# Patient Record
Sex: FEMALE | Race: WHITE | Employment: UNEMPLOYED
[De-identification: names, ages, dates, MRNs, and addresses within clinical notes are randomized per-mention and may not be internally consistent; named-entity substitution may affect disease eponyms.]

---

## 2017-01-17 RX ORDER — SUMATRIPTAN 100 MG/1
TABLET, FILM COATED ORAL
Qty: 9 TABLET | Refills: 1 | Status: SHIPPED | OUTPATIENT
Start: 2017-01-17 | End: 2017-05-30 | Stop reason: ALTCHOICE

## 2017-01-23 RX ORDER — TOPIRAMATE 50 MG/1
TABLET, FILM COATED ORAL
Qty: 75 TABLET | Refills: 0 | OUTPATIENT
Start: 2017-01-23 | End: 2017-02-13 | Stop reason: SDUPTHER

## 2017-01-26 ENCOUNTER — TELEPHONE (OUTPATIENT)
Dept: NEUROLOGY | Facility: CLINIC | Age: 57
End: 2017-01-26

## 2017-01-26 ENCOUNTER — PROCEDURE VISIT (OUTPATIENT)
Dept: NEUROLOGY | Facility: CLINIC | Age: 57
End: 2017-01-26

## 2017-01-26 DIAGNOSIS — G43.711 CHRONIC MIGRAINE WITHOUT AURA, WITH INTRACTABLE MIGRAINE, SO STATED, WITH STATUS MIGRAINOSUS: Primary | ICD-10-CM

## 2017-01-26 PROCEDURE — 64615 CHEMODENERV MUSC MIGRAINE: CPT | Performed by: PSYCHIATRY & NEUROLOGY

## 2017-02-14 RX ORDER — TOPIRAMATE 50 MG/1
TABLET, FILM COATED ORAL
Qty: 75 TABLET | Refills: 0 | Status: SHIPPED | OUTPATIENT
Start: 2017-02-14 | End: 2017-03-21 | Stop reason: SDUPTHER

## 2017-02-16 ENCOUNTER — HOSPITAL ENCOUNTER (EMERGENCY)
Age: 57
Discharge: HOME | End: 2017-02-16
Attending: EMERGENCY MEDICINE
Payer: MEDICARE

## 2017-02-16 ENCOUNTER — APPOINTMENT (OUTPATIENT)
Dept: GENERAL RADIOLOGY | Age: 57
End: 2017-02-16
Payer: MEDICARE

## 2017-02-16 VITALS
DIASTOLIC BLOOD PRESSURE: 52 MMHG | WEIGHT: 189 LBS | BODY MASS INDEX: 32.27 KG/M2 | HEIGHT: 64 IN | RESPIRATION RATE: 15 BRPM | OXYGEN SATURATION: 97 % | TEMPERATURE: 98.1 F | HEART RATE: 77 BPM | SYSTOLIC BLOOD PRESSURE: 98 MMHG

## 2017-02-16 DIAGNOSIS — R10.13 ABDOMINAL PAIN, EPIGASTRIC: ICD-10-CM

## 2017-02-16 DIAGNOSIS — R07.89 CHEST WALL PAIN: Primary | ICD-10-CM

## 2017-02-16 LAB
ABSOLUTE EOS #: 0.2 K/UL (ref 0–0.4)
ABSOLUTE LYMPH #: 4.1 K/UL (ref 1–4.8)
ABSOLUTE MONO #: 0.8 K/UL (ref 0.1–1.2)
ANION GAP SERPL CALCULATED.3IONS-SCNC: 16 MMOL/L (ref 9–17)
BASOPHILS # BLD: 2 % (ref 0–2)
BASOPHILS ABSOLUTE: 0.2 K/UL (ref 0–0.2)
BUN BLDV-MCNC: 11 MG/DL (ref 6–20)
BUN/CREAT BLD: NORMAL (ref 9–20)
CALCIUM SERPL-MCNC: 9.4 MG/DL (ref 8.6–10.4)
CHLORIDE BLD-SCNC: 101 MMOL/L (ref 98–107)
CO2: 22 MMOL/L (ref 20–31)
CREAT SERPL-MCNC: 0.66 MG/DL (ref 0.5–0.9)
DIFFERENTIAL TYPE: ABNORMAL
EOSINOPHILS RELATIVE PERCENT: 2 % (ref 1–4)
GFR AFRICAN AMERICAN: >60 ML/MIN
GFR NON-AFRICAN AMERICAN: >60 ML/MIN
GFR SERPL CREATININE-BSD FRML MDRD: NORMAL ML/MIN/{1.73_M2}
GFR SERPL CREATININE-BSD FRML MDRD: NORMAL ML/MIN/{1.73_M2}
GLUCOSE BLD-MCNC: 93 MG/DL (ref 70–99)
HCT VFR BLD CALC: 49 % (ref 36–46)
HEMOGLOBIN: 16 G/DL (ref 12–16)
LYMPHOCYTES # BLD: 43 % (ref 24–44)
MCH RBC QN AUTO: 27.8 PG (ref 26–34)
MCHC RBC AUTO-ENTMCNC: 32.7 G/DL (ref 31–37)
MCV RBC AUTO: 84.8 FL (ref 80–100)
MONOCYTES # BLD: 8 % (ref 2–11)
MYOGLOBIN: <21 NG/ML (ref 25–58)
PDW BLD-RTO: 14.9 % (ref 12.5–15.4)
PLATELET # BLD: 252 K/UL (ref 140–450)
PLATELET ESTIMATE: ABNORMAL
PMV BLD AUTO: 9.2 FL (ref 6–12)
POTASSIUM SERPL-SCNC: 3.9 MMOL/L (ref 3.7–5.3)
RBC # BLD: 5.78 M/UL (ref 4–5.2)
RBC # BLD: ABNORMAL 10*6/UL
SEG NEUTROPHILS: 45 % (ref 36–66)
SEGMENTED NEUTROPHILS ABSOLUTE COUNT: 4.5 K/UL (ref 1.8–7.7)
SODIUM BLD-SCNC: 139 MMOL/L (ref 135–144)
TROPONIN INTERP: ABNORMAL
TROPONIN T: <0.03 NG/ML
WBC # BLD: 9.7 K/UL (ref 3.5–11)
WBC # BLD: ABNORMAL 10*3/UL

## 2017-02-16 PROCEDURE — 80048 BASIC METABOLIC PNL TOTAL CA: CPT

## 2017-02-16 PROCEDURE — 84484 ASSAY OF TROPONIN QUANT: CPT

## 2017-02-16 PROCEDURE — 71020 XR CHEST STANDARD TWO VW: CPT

## 2017-02-16 PROCEDURE — 96372 THER/PROPH/DIAG INJ SC/IM: CPT

## 2017-02-16 PROCEDURE — 36415 COLL VENOUS BLD VENIPUNCTURE: CPT

## 2017-02-16 PROCEDURE — 6370000000 HC RX 637 (ALT 250 FOR IP): Performed by: EMERGENCY MEDICINE

## 2017-02-16 PROCEDURE — 71020 XR CHEST STANDARD TWO VW: CPT | Performed by: RADIOLOGY

## 2017-02-16 PROCEDURE — 83874 ASSAY OF MYOGLOBIN: CPT

## 2017-02-16 PROCEDURE — 6360000002 HC RX W HCPCS: Performed by: EMERGENCY MEDICINE

## 2017-02-16 PROCEDURE — 96374 THER/PROPH/DIAG INJ IV PUSH: CPT

## 2017-02-16 PROCEDURE — 93005 ELECTROCARDIOGRAM TRACING: CPT

## 2017-02-16 PROCEDURE — 85025 COMPLETE CBC W/AUTO DIFF WBC: CPT

## 2017-02-16 PROCEDURE — 99285 EMERGENCY DEPT VISIT HI MDM: CPT

## 2017-02-16 PROCEDURE — 96375 TX/PRO/DX INJ NEW DRUG ADDON: CPT

## 2017-02-16 PROCEDURE — 96376 TX/PRO/DX INJ SAME DRUG ADON: CPT

## 2017-02-16 RX ORDER — METOCLOPRAMIDE HYDROCHLORIDE 5 MG/ML
10 INJECTION INTRAMUSCULAR; INTRAVENOUS ONCE
Status: COMPLETED | OUTPATIENT
Start: 2017-02-16 | End: 2017-02-16

## 2017-02-16 RX ORDER — ONDANSETRON 2 MG/ML
4 INJECTION INTRAMUSCULAR; INTRAVENOUS ONCE
Status: COMPLETED | OUTPATIENT
Start: 2017-02-16 | End: 2017-02-16

## 2017-02-16 RX ORDER — METOCLOPRAMIDE HYDROCHLORIDE 5 MG/ML
10 INJECTION INTRAMUSCULAR; INTRAVENOUS ONCE
Status: DISCONTINUED | OUTPATIENT
Start: 2017-02-16 | End: 2017-02-16

## 2017-02-16 RX ORDER — MORPHINE SULFATE 4 MG/ML
4 INJECTION, SOLUTION INTRAMUSCULAR; INTRAVENOUS ONCE
Status: COMPLETED | OUTPATIENT
Start: 2017-02-16 | End: 2017-02-16

## 2017-02-16 RX ORDER — ASPIRIN 81 MG/1
324 TABLET, CHEWABLE ORAL ONCE
Status: COMPLETED | OUTPATIENT
Start: 2017-02-16 | End: 2017-02-16

## 2017-02-16 RX ADMIN — HYDROMORPHONE HYDROCHLORIDE 1 MG: 1 INJECTION, SOLUTION INTRAMUSCULAR; INTRAVENOUS; SUBCUTANEOUS at 20:26

## 2017-02-16 RX ADMIN — MORPHINE SULFATE 4 MG: 4 INJECTION, SOLUTION INTRAMUSCULAR; INTRAVENOUS at 19:27

## 2017-02-16 RX ADMIN — ONDANSETRON 4 MG: 2 INJECTION, SOLUTION INTRAMUSCULAR; INTRAVENOUS at 19:27

## 2017-02-16 RX ADMIN — METOCLOPRAMIDE 10 MG: 5 INJECTION, SOLUTION INTRAMUSCULAR; INTRAVENOUS at 20:26

## 2017-02-16 RX ADMIN — ASPIRIN 81 MG 324 MG: 81 TABLET ORAL at 19:27

## 2017-02-16 RX ADMIN — ONDANSETRON 4 MG: 2 INJECTION, SOLUTION INTRAMUSCULAR; INTRAVENOUS at 20:26

## 2017-02-16 ASSESSMENT — PAIN DESCRIPTION - ORIENTATION: ORIENTATION: RIGHT

## 2017-02-16 ASSESSMENT — ENCOUNTER SYMPTOMS
BACK PAIN: 0
WHEEZING: 0
COLOR CHANGE: 0
SHORTNESS OF BREATH: 0
EYE PAIN: 0
CHEST TIGHTNESS: 0
NAUSEA: 1
EYE REDNESS: 0
CONSTIPATION: 0
ABDOMINAL PAIN: 0
DIARRHEA: 0
RHINORRHEA: 0
EYE DISCHARGE: 0
SORE THROAT: 0
COUGH: 0

## 2017-02-16 ASSESSMENT — PAIN DESCRIPTION - DESCRIPTORS: DESCRIPTORS: HEADACHE

## 2017-02-16 ASSESSMENT — PAIN SCALES - GENERAL
PAINLEVEL_OUTOF10: 7
PAINLEVEL_OUTOF10: 7

## 2017-02-16 ASSESSMENT — PAIN DESCRIPTION - PAIN TYPE
TYPE: ACUTE PAIN
TYPE: ACUTE PAIN

## 2017-02-16 ASSESSMENT — PAIN DESCRIPTION - LOCATION
LOCATION: ABDOMEN;HEAD
LOCATION: ABDOMEN;HEAD

## 2017-02-16 ASSESSMENT — PAIN DESCRIPTION - PROGRESSION: CLINICAL_PROGRESSION: NOT CHANGED

## 2017-02-17 LAB
EKG ATRIAL RATE: 90 BPM
EKG P AXIS: 64 DEGREES
EKG P-R INTERVAL: 152 MS
EKG Q-T INTERVAL: 376 MS
EKG QRS DURATION: 84 MS
EKG QTC CALCULATION (BAZETT): 459 MS
EKG R AXIS: 52 DEGREES
EKG T AXIS: 30 DEGREES
EKG VENTRICULAR RATE: 90 BPM

## 2017-03-21 ENCOUNTER — HOSPITAL ENCOUNTER (EMERGENCY)
Age: 57
Discharge: HOME | End: 2017-03-22
Attending: EMERGENCY MEDICINE
Payer: MEDICARE

## 2017-03-21 DIAGNOSIS — G43.D0 ABDOMINAL MIGRAINE, NOT INTRACTABLE: Primary | ICD-10-CM

## 2017-03-21 PROCEDURE — 99283 EMERGENCY DEPT VISIT LOW MDM: CPT

## 2017-03-21 RX ORDER — 0.9 % SODIUM CHLORIDE 0.9 %
1000 INTRAVENOUS SOLUTION INTRAVENOUS ONCE
Status: COMPLETED | OUTPATIENT
Start: 2017-03-21 | End: 2017-03-22

## 2017-03-21 RX ORDER — PROMETHAZINE HYDROCHLORIDE 25 MG/ML
25 INJECTION, SOLUTION INTRAMUSCULAR; INTRAVENOUS ONCE
Status: COMPLETED | OUTPATIENT
Start: 2017-03-21 | End: 2017-03-22

## 2017-03-21 RX ORDER — TOPIRAMATE 50 MG/1
TABLET, FILM COATED ORAL
Qty: 75 TABLET | Refills: 0 | Status: SHIPPED | OUTPATIENT
Start: 2017-03-21 | End: 2017-04-18 | Stop reason: SDUPTHER

## 2017-03-21 ASSESSMENT — PAIN DESCRIPTION - PAIN TYPE: TYPE: ACUTE PAIN

## 2017-03-21 ASSESSMENT — PAIN DESCRIPTION - LOCATION: LOCATION: ABDOMEN

## 2017-03-21 ASSESSMENT — PAIN SCALES - GENERAL: PAINLEVEL_OUTOF10: 8

## 2017-03-22 VITALS
SYSTOLIC BLOOD PRESSURE: 127 MMHG | BODY MASS INDEX: 32.27 KG/M2 | WEIGHT: 189 LBS | HEIGHT: 64 IN | HEART RATE: 81 BPM | OXYGEN SATURATION: 97 % | RESPIRATION RATE: 16 BRPM | DIASTOLIC BLOOD PRESSURE: 79 MMHG | TEMPERATURE: 98.4 F

## 2017-03-22 PROCEDURE — 96375 TX/PRO/DX INJ NEW DRUG ADDON: CPT

## 2017-03-22 PROCEDURE — 6360000002 HC RX W HCPCS: Performed by: EMERGENCY MEDICINE

## 2017-03-22 PROCEDURE — 96374 THER/PROPH/DIAG INJ IV PUSH: CPT

## 2017-03-22 PROCEDURE — 2580000003 HC RX 258: Performed by: EMERGENCY MEDICINE

## 2017-03-22 PROCEDURE — 96376 TX/PRO/DX INJ SAME DRUG ADON: CPT

## 2017-03-22 RX ORDER — ONDANSETRON 2 MG/ML
4 INJECTION INTRAMUSCULAR; INTRAVENOUS ONCE
Status: COMPLETED | OUTPATIENT
Start: 2017-03-22 | End: 2017-03-22

## 2017-03-22 RX ORDER — KETOROLAC TROMETHAMINE 30 MG/ML
30 INJECTION, SOLUTION INTRAMUSCULAR; INTRAVENOUS ONCE
Status: COMPLETED | OUTPATIENT
Start: 2017-03-22 | End: 2017-03-22

## 2017-03-22 RX ADMIN — HYDROMORPHONE HYDROCHLORIDE 1 MG: 1 INJECTION, SOLUTION INTRAMUSCULAR; INTRAVENOUS; SUBCUTANEOUS at 01:14

## 2017-03-22 RX ADMIN — ONDANSETRON 4 MG: 2 INJECTION, SOLUTION INTRAMUSCULAR; INTRAVENOUS at 01:13

## 2017-03-22 RX ADMIN — HYDROMORPHONE HYDROCHLORIDE 1 MG: 1 INJECTION, SOLUTION INTRAMUSCULAR; INTRAVENOUS; SUBCUTANEOUS at 00:06

## 2017-03-22 RX ADMIN — SODIUM CHLORIDE 1000 ML: 9 INJECTION, SOLUTION INTRAVENOUS at 00:06

## 2017-03-22 RX ADMIN — PROMETHAZINE HYDROCHLORIDE 25 MG: 25 INJECTION, SOLUTION INTRAMUSCULAR; INTRAVENOUS at 00:06

## 2017-03-22 RX ADMIN — KETOROLAC TROMETHAMINE 30 MG: 30 INJECTION, SOLUTION INTRAMUSCULAR at 01:13

## 2017-03-22 ASSESSMENT — PAIN SCALES - GENERAL
PAINLEVEL_OUTOF10: 8
PAINLEVEL_OUTOF10: 6
PAINLEVEL_OUTOF10: 6
PAINLEVEL_OUTOF10: 0

## 2017-03-22 ASSESSMENT — ENCOUNTER SYMPTOMS
COLOR CHANGE: 0
SORE THROAT: 0
RHINORRHEA: 0
COUGH: 0
SHORTNESS OF BREATH: 0
EYE PAIN: 0
DIARRHEA: 0
BACK PAIN: 0
WHEEZING: 0
NAUSEA: 1
ABDOMINAL PAIN: 1
EYE DISCHARGE: 0
CHEST TIGHTNESS: 0
EYE REDNESS: 0
CONSTIPATION: 0

## 2017-03-23 RX ORDER — RIZATRIPTAN BENZOATE 5 MG/1
TABLET, ORALLY DISINTEGRATING ORAL
Qty: 10 TABLET | Refills: 1 | Status: SHIPPED | OUTPATIENT
Start: 2017-03-23 | End: 2017-04-26 | Stop reason: SDUPTHER

## 2017-03-31 ENCOUNTER — APPOINTMENT (OUTPATIENT)
Dept: GENERAL RADIOLOGY | Age: 57
End: 2017-03-31
Payer: MEDICARE

## 2017-03-31 ENCOUNTER — HOSPITAL ENCOUNTER (EMERGENCY)
Age: 57
Discharge: HOME | End: 2017-03-31
Attending: EMERGENCY MEDICINE
Payer: MEDICARE

## 2017-03-31 VITALS
BODY MASS INDEX: 31.92 KG/M2 | RESPIRATION RATE: 21 BRPM | HEART RATE: 86 BPM | OXYGEN SATURATION: 94 % | WEIGHT: 187 LBS | HEIGHT: 64 IN | DIASTOLIC BLOOD PRESSURE: 83 MMHG | TEMPERATURE: 98.2 F | SYSTOLIC BLOOD PRESSURE: 166 MMHG

## 2017-03-31 DIAGNOSIS — G43.D0: Primary | ICD-10-CM

## 2017-03-31 LAB
ABSOLUTE EOS #: 0.2 K/UL (ref 0–0.4)
ABSOLUTE LYMPH #: 3.9 K/UL (ref 1–4.8)
ABSOLUTE MONO #: 0.6 K/UL (ref 0.1–1.2)
ALBUMIN SERPL-MCNC: 4.1 G/DL (ref 3.5–5.2)
ALBUMIN/GLOBULIN RATIO: 1.5 (ref 1–2.5)
ALP BLD-CCNC: 80 U/L (ref 35–104)
ALT SERPL-CCNC: 14 U/L (ref 5–33)
AMYLASE: 31 U/L (ref 28–100)
ANION GAP SERPL CALCULATED.3IONS-SCNC: 15 MMOL/L (ref 9–17)
AST SERPL-CCNC: 19 U/L
BASOPHILS # BLD: 1 % (ref 0–2)
BASOPHILS ABSOLUTE: 0.1 K/UL (ref 0–0.2)
BILIRUB SERPL-MCNC: 0.19 MG/DL (ref 0.3–1.2)
BILIRUBIN DIRECT: <0.08 MG/DL
BILIRUBIN URINE: NEGATIVE
BILIRUBIN, INDIRECT: ABNORMAL MG/DL (ref 0–1)
BUN BLDV-MCNC: 7 MG/DL (ref 6–20)
BUN/CREAT BLD: ABNORMAL (ref 9–20)
CALCIUM SERPL-MCNC: 9 MG/DL (ref 8.6–10.4)
CHLORIDE BLD-SCNC: 107 MMOL/L (ref 98–107)
CO2: 21 MMOL/L (ref 20–31)
COLOR: YELLOW
COMMENT UA: NORMAL
CREAT SERPL-MCNC: 0.56 MG/DL (ref 0.5–0.9)
DIFFERENTIAL TYPE: ABNORMAL
EOSINOPHILS RELATIVE PERCENT: 2 % (ref 1–4)
GFR AFRICAN AMERICAN: >60 ML/MIN
GFR NON-AFRICAN AMERICAN: >60 ML/MIN
GFR SERPL CREATININE-BSD FRML MDRD: ABNORMAL ML/MIN/{1.73_M2}
GFR SERPL CREATININE-BSD FRML MDRD: ABNORMAL ML/MIN/{1.73_M2}
GLOBULIN: ABNORMAL G/DL (ref 1.5–3.8)
GLUCOSE BLD-MCNC: 100 MG/DL (ref 70–99)
GLUCOSE URINE: NEGATIVE
HCT VFR BLD CALC: 44.5 % (ref 36–46)
HEMOGLOBIN: 14.7 G/DL (ref 12–16)
KETONES, URINE: NEGATIVE
LACTIC ACID: 0.6 MMOL/L (ref 0.5–2.2)
LEUKOCYTE ESTERASE, URINE: NEGATIVE
LIPASE: 28 U/L (ref 13–60)
LYMPHOCYTES # BLD: 44 % (ref 24–44)
MCH RBC QN AUTO: 28.1 PG (ref 26–34)
MCHC RBC AUTO-ENTMCNC: 33.1 G/DL (ref 31–37)
MCV RBC AUTO: 84.8 FL (ref 80–100)
MONOCYTES # BLD: 7 % (ref 2–11)
NITRITE, URINE: NEGATIVE
PDW BLD-RTO: 14.2 % (ref 12.5–15.4)
PH UA: 7 (ref 5–8)
PLATELET # BLD: 250 K/UL (ref 140–450)
PLATELET ESTIMATE: ABNORMAL
PMV BLD AUTO: 9.6 FL (ref 6–12)
POTASSIUM SERPL-SCNC: 4 MMOL/L (ref 3.7–5.3)
PROTEIN UA: NEGATIVE
RBC # BLD: 5.25 M/UL (ref 4–5.2)
RBC # BLD: ABNORMAL 10*6/UL
SEG NEUTROPHILS: 46 % (ref 36–66)
SEGMENTED NEUTROPHILS ABSOLUTE COUNT: 4.1 K/UL (ref 1.8–7.7)
SODIUM BLD-SCNC: 143 MMOL/L (ref 135–144)
SPECIFIC GRAVITY UA: 1.01 (ref 1–1.03)
TOTAL PROTEIN: 6.9 G/DL (ref 6.4–8.3)
TROPONIN INTERP: NORMAL
TROPONIN T: <0.03 NG/ML
TURBIDITY: CLEAR
URINE HGB: NEGATIVE
UROBILINOGEN, URINE: NORMAL
WBC # BLD: 8.9 K/UL (ref 3.5–11)
WBC # BLD: ABNORMAL 10*3/UL

## 2017-03-31 PROCEDURE — 80048 BASIC METABOLIC PNL TOTAL CA: CPT

## 2017-03-31 PROCEDURE — 96374 THER/PROPH/DIAG INJ IV PUSH: CPT

## 2017-03-31 PROCEDURE — 93005 ELECTROCARDIOGRAM TRACING: CPT

## 2017-03-31 PROCEDURE — 83690 ASSAY OF LIPASE: CPT

## 2017-03-31 PROCEDURE — 84484 ASSAY OF TROPONIN QUANT: CPT

## 2017-03-31 PROCEDURE — 74022 RADEX COMPL AQT ABD SERIES: CPT

## 2017-03-31 PROCEDURE — 96375 TX/PRO/DX INJ NEW DRUG ADDON: CPT

## 2017-03-31 PROCEDURE — 36415 COLL VENOUS BLD VENIPUNCTURE: CPT

## 2017-03-31 PROCEDURE — 80076 HEPATIC FUNCTION PANEL: CPT

## 2017-03-31 PROCEDURE — 6360000002 HC RX W HCPCS: Performed by: EMERGENCY MEDICINE

## 2017-03-31 PROCEDURE — 96361 HYDRATE IV INFUSION ADD-ON: CPT

## 2017-03-31 PROCEDURE — 83605 ASSAY OF LACTIC ACID: CPT

## 2017-03-31 PROCEDURE — 96376 TX/PRO/DX INJ SAME DRUG ADON: CPT

## 2017-03-31 PROCEDURE — 85025 COMPLETE CBC W/AUTO DIFF WBC: CPT

## 2017-03-31 PROCEDURE — 82150 ASSAY OF AMYLASE: CPT

## 2017-03-31 PROCEDURE — 2580000003 HC RX 258: Performed by: EMERGENCY MEDICINE

## 2017-03-31 PROCEDURE — 99285 EMERGENCY DEPT VISIT HI MDM: CPT

## 2017-03-31 RX ORDER — ONDANSETRON 2 MG/ML
4 INJECTION INTRAMUSCULAR; INTRAVENOUS ONCE
Status: COMPLETED | OUTPATIENT
Start: 2017-03-31 | End: 2017-03-31

## 2017-03-31 RX ORDER — ARIPIPRAZOLE 5 MG/1
15 TABLET ORAL DAILY
COMMUNITY
End: 2021-12-13

## 2017-03-31 RX ORDER — 0.9 % SODIUM CHLORIDE 0.9 %
500 INTRAVENOUS SOLUTION INTRAVENOUS ONCE
Status: COMPLETED | OUTPATIENT
Start: 2017-03-31 | End: 2017-03-31

## 2017-03-31 RX ORDER — KETOROLAC TROMETHAMINE 30 MG/ML
30 INJECTION, SOLUTION INTRAMUSCULAR; INTRAVENOUS ONCE
Status: COMPLETED | OUTPATIENT
Start: 2017-03-31 | End: 2017-03-31

## 2017-03-31 RX ADMIN — KETOROLAC TROMETHAMINE 30 MG: 30 INJECTION, SOLUTION INTRAMUSCULAR at 20:55

## 2017-03-31 RX ADMIN — HYDROMORPHONE HYDROCHLORIDE 2 MG: 1 INJECTION, SOLUTION INTRAMUSCULAR; INTRAVENOUS; SUBCUTANEOUS at 19:56

## 2017-03-31 RX ADMIN — SODIUM CHLORIDE 500 ML: 9 INJECTION, SOLUTION INTRAVENOUS at 19:55

## 2017-03-31 RX ADMIN — ONDANSETRON 4 MG: 2 INJECTION, SOLUTION INTRAMUSCULAR; INTRAVENOUS at 19:56

## 2017-03-31 RX ADMIN — ONDANSETRON 4 MG: 2 INJECTION, SOLUTION INTRAMUSCULAR; INTRAVENOUS at 20:50

## 2017-03-31 ASSESSMENT — PAIN SCALES - GENERAL
PAINLEVEL_OUTOF10: 2
PAINLEVEL_OUTOF10: 8
PAINLEVEL_OUTOF10: 6
PAINLEVEL_OUTOF10: 8
PAINLEVEL_OUTOF10: 6

## 2017-03-31 ASSESSMENT — PAIN DESCRIPTION - LOCATION
LOCATION: ABDOMEN;HEAD
LOCATION: ABDOMEN

## 2017-03-31 ASSESSMENT — PAIN DESCRIPTION - PAIN TYPE: TYPE: ACUTE PAIN

## 2017-03-31 ASSESSMENT — PAIN DESCRIPTION - PROGRESSION
CLINICAL_PROGRESSION: RAPIDLY IMPROVING
CLINICAL_PROGRESSION: GRADUALLY IMPROVING

## 2017-04-01 LAB
EKG ATRIAL RATE: 68 BPM
EKG P AXIS: 55 DEGREES
EKG P-R INTERVAL: 144 MS
EKG Q-T INTERVAL: 398 MS
EKG QRS DURATION: 86 MS
EKG QTC CALCULATION (BAZETT): 423 MS
EKG R AXIS: 51 DEGREES
EKG T AXIS: 40 DEGREES
EKG VENTRICULAR RATE: 68 BPM

## 2017-04-06 ENCOUNTER — HOSPITAL ENCOUNTER (EMERGENCY)
Age: 57
Discharge: HOME | End: 2017-04-06
Attending: EMERGENCY MEDICINE
Payer: MEDICARE

## 2017-04-06 VITALS
RESPIRATION RATE: 18 BRPM | HEART RATE: 79 BPM | SYSTOLIC BLOOD PRESSURE: 151 MMHG | OXYGEN SATURATION: 97 % | HEIGHT: 64 IN | DIASTOLIC BLOOD PRESSURE: 71 MMHG | TEMPERATURE: 98.1 F | BODY MASS INDEX: 30.9 KG/M2 | WEIGHT: 181 LBS

## 2017-04-06 DIAGNOSIS — L02.91 ABSCESS: Primary | ICD-10-CM

## 2017-04-06 PROCEDURE — 2500000003 HC RX 250 WO HCPCS: Performed by: EMERGENCY MEDICINE

## 2017-04-06 PROCEDURE — 10060 I&D ABSCESS SIMPLE/SINGLE: CPT

## 2017-04-06 PROCEDURE — 99282 EMERGENCY DEPT VISIT SF MDM: CPT

## 2017-04-06 RX ORDER — CLONAZEPAM 0.5 MG/1
0.5 TABLET ORAL 3 TIMES DAILY PRN
Status: ON HOLD | COMMUNITY
End: 2018-11-27

## 2017-04-06 RX ORDER — LIDOCAINE HYDROCHLORIDE AND EPINEPHRINE 10; 10 MG/ML; UG/ML
20 INJECTION, SOLUTION INFILTRATION; PERINEURAL ONCE
Status: COMPLETED | OUTPATIENT
Start: 2017-04-06 | End: 2017-04-06

## 2017-04-06 RX ORDER — CEPHALEXIN 500 MG/1
500 CAPSULE ORAL 4 TIMES DAILY
Qty: 40 CAPSULE | Refills: 0 | Status: SHIPPED | OUTPATIENT
Start: 2017-04-06 | End: 2017-04-16

## 2017-04-06 RX ADMIN — LIDOCAINE HYDROCHLORIDE AND EPINEPHRINE 20 ML: 10; 10 INJECTION, SOLUTION INFILTRATION; PERINEURAL at 20:19

## 2017-04-06 ASSESSMENT — PAIN DESCRIPTION - ORIENTATION: ORIENTATION: LEFT

## 2017-04-06 ASSESSMENT — PAIN DESCRIPTION - FREQUENCY: FREQUENCY: CONTINUOUS

## 2017-04-06 ASSESSMENT — PAIN SCALES - GENERAL: PAINLEVEL_OUTOF10: 10

## 2017-04-06 ASSESSMENT — PAIN DESCRIPTION - PAIN TYPE: TYPE: ACUTE PAIN

## 2017-04-18 RX ORDER — TOPIRAMATE 50 MG/1
TABLET, FILM COATED ORAL
Qty: 75 TABLET | Refills: 0 | Status: SHIPPED | OUTPATIENT
Start: 2017-04-18 | End: 2017-05-15 | Stop reason: SDUPTHER

## 2017-04-26 ENCOUNTER — PROCEDURE VISIT (OUTPATIENT)
Dept: NEUROLOGY | Age: 57
End: 2017-04-26
Payer: MEDICARE

## 2017-04-26 DIAGNOSIS — G43.711 CHRONIC MIGRAINE WITHOUT AURA, WITH INTRACTABLE MIGRAINE, SO STATED, WITH STATUS MIGRAINOSUS: Primary | ICD-10-CM

## 2017-04-26 PROCEDURE — 64615 CHEMODENERV MUSC MIGRAINE: CPT | Performed by: PSYCHIATRY & NEUROLOGY

## 2017-04-26 RX ORDER — SUMATRIPTAN 6 MG/.5ML
INJECTION, SOLUTION SUBCUTANEOUS
Qty: 2 VIAL | Refills: 3 | Status: SHIPPED | OUTPATIENT
Start: 2017-04-26 | End: 2017-05-30 | Stop reason: ALTCHOICE

## 2017-04-26 RX ORDER — BUTALBITAL, ACETAMINOPHEN AND CAFFEINE 50; 325; 40 MG/1; MG/1; MG/1
1 TABLET ORAL EVERY 6 HOURS PRN
Qty: 60 TABLET | Refills: 3 | Status: SHIPPED | OUTPATIENT
Start: 2017-04-26 | End: 2019-04-23 | Stop reason: ALTCHOICE

## 2017-04-26 RX ORDER — RIZATRIPTAN BENZOATE 5 MG/1
TABLET, ORALLY DISINTEGRATING ORAL
Qty: 10 TABLET | Refills: 1 | Status: SHIPPED | OUTPATIENT
Start: 2017-04-26 | End: 2018-07-11 | Stop reason: SDUPTHER

## 2017-04-30 ENCOUNTER — HOSPITAL ENCOUNTER (EMERGENCY)
Age: 57
Discharge: HOME | End: 2017-04-30
Attending: EMERGENCY MEDICINE
Payer: MEDICARE

## 2017-04-30 ENCOUNTER — APPOINTMENT (OUTPATIENT)
Dept: GENERAL RADIOLOGY | Age: 57
End: 2017-04-30
Payer: MEDICARE

## 2017-04-30 VITALS
DIASTOLIC BLOOD PRESSURE: 80 MMHG | WEIGHT: 189 LBS | HEIGHT: 64 IN | SYSTOLIC BLOOD PRESSURE: 131 MMHG | TEMPERATURE: 98.2 F | BODY MASS INDEX: 32.27 KG/M2 | HEART RATE: 98 BPM | OXYGEN SATURATION: 98 % | RESPIRATION RATE: 18 BRPM

## 2017-04-30 DIAGNOSIS — M25.561 ACUTE PAIN OF RIGHT KNEE: Primary | ICD-10-CM

## 2017-04-30 PROCEDURE — 99283 EMERGENCY DEPT VISIT LOW MDM: CPT

## 2017-04-30 PROCEDURE — 73562 X-RAY EXAM OF KNEE 3: CPT

## 2017-04-30 PROCEDURE — 6360000002 HC RX W HCPCS: Performed by: EMERGENCY MEDICINE

## 2017-04-30 PROCEDURE — 96372 THER/PROPH/DIAG INJ SC/IM: CPT

## 2017-04-30 RX ORDER — KETOROLAC TROMETHAMINE 30 MG/ML
30 INJECTION, SOLUTION INTRAMUSCULAR; INTRAVENOUS ONCE
Status: COMPLETED | OUTPATIENT
Start: 2017-04-30 | End: 2017-04-30

## 2017-04-30 RX ORDER — AMOXICILLIN 500 MG/1
500 CAPSULE ORAL 3 TIMES DAILY
COMMUNITY
End: 2017-05-30 | Stop reason: ALTCHOICE

## 2017-04-30 RX ORDER — CIPROFLOXACIN 500 MG/1
500 TABLET, FILM COATED ORAL 2 TIMES DAILY
COMMUNITY
End: 2017-05-30 | Stop reason: ALTCHOICE

## 2017-04-30 RX ADMIN — KETOROLAC TROMETHAMINE 30 MG: 30 INJECTION, SOLUTION INTRAMUSCULAR at 12:14

## 2017-04-30 ASSESSMENT — PAIN DESCRIPTION - LOCATION: LOCATION: KNEE

## 2017-04-30 ASSESSMENT — PAIN DESCRIPTION - ORIENTATION: ORIENTATION: RIGHT

## 2017-04-30 ASSESSMENT — PAIN SCALES - GENERAL
PAINLEVEL_OUTOF10: 10
PAINLEVEL_OUTOF10: 10
PAINLEVEL_OUTOF10: 8

## 2017-04-30 ASSESSMENT — PAIN DESCRIPTION - DESCRIPTORS: DESCRIPTORS: SHARP;THROBBING

## 2017-05-16 RX ORDER — TOPIRAMATE 50 MG/1
TABLET, FILM COATED ORAL
Qty: 75 TABLET | Refills: 3 | Status: SHIPPED | OUTPATIENT
Start: 2017-05-16 | End: 2017-10-06 | Stop reason: SDUPTHER

## 2017-05-24 ENCOUNTER — HOSPITAL ENCOUNTER (EMERGENCY)
Age: 57
Discharge: HOME | End: 2017-05-24
Attending: EMERGENCY MEDICINE
Payer: MEDICARE

## 2017-05-24 VITALS
DIASTOLIC BLOOD PRESSURE: 64 MMHG | HEART RATE: 68 BPM | HEIGHT: 64 IN | OXYGEN SATURATION: 100 % | WEIGHT: 189 LBS | TEMPERATURE: 98.2 F | SYSTOLIC BLOOD PRESSURE: 120 MMHG | RESPIRATION RATE: 16 BRPM | BODY MASS INDEX: 32.27 KG/M2

## 2017-05-24 DIAGNOSIS — M25.561 RIGHT MEDIAL KNEE PAIN: Primary | ICD-10-CM

## 2017-05-24 PROCEDURE — 96372 THER/PROPH/DIAG INJ SC/IM: CPT

## 2017-05-24 PROCEDURE — 90715 TDAP VACCINE 7 YRS/> IM: CPT | Performed by: PHYSICIAN ASSISTANT

## 2017-05-24 PROCEDURE — 90471 IMMUNIZATION ADMIN: CPT | Performed by: PHYSICIAN ASSISTANT

## 2017-05-24 PROCEDURE — 99283 EMERGENCY DEPT VISIT LOW MDM: CPT

## 2017-05-24 PROCEDURE — 6360000002 HC RX W HCPCS: Performed by: PHYSICIAN ASSISTANT

## 2017-05-24 RX ORDER — KETOROLAC TROMETHAMINE 30 MG/ML
60 INJECTION, SOLUTION INTRAMUSCULAR; INTRAVENOUS ONCE
Status: COMPLETED | OUTPATIENT
Start: 2017-05-24 | End: 2017-05-24

## 2017-05-24 RX ADMIN — TETANUS TOXOID, REDUCED DIPHTHERIA TOXOID AND ACELLULAR PERTUSSIS VACCINE, ADSORBED 0.5 ML: 5; 2.5; 8; 8; 2.5 SUSPENSION INTRAMUSCULAR at 12:44

## 2017-05-24 RX ADMIN — KETOROLAC TROMETHAMINE 60 MG: 30 INJECTION, SOLUTION INTRAMUSCULAR at 12:45

## 2017-05-24 ASSESSMENT — PAIN DESCRIPTION - PAIN TYPE: TYPE: ACUTE PAIN

## 2017-05-24 ASSESSMENT — PAIN SCALES - GENERAL: PAINLEVEL_OUTOF10: 10

## 2017-05-30 ENCOUNTER — OFFICE VISIT (OUTPATIENT)
Dept: NEUROLOGY | Age: 57
End: 2017-05-30
Payer: MEDICARE

## 2017-05-30 ENCOUNTER — TELEPHONE (OUTPATIENT)
Dept: NEUROLOGY | Age: 57
End: 2017-05-30

## 2017-05-30 VITALS
SYSTOLIC BLOOD PRESSURE: 145 MMHG | BODY MASS INDEX: 32.33 KG/M2 | DIASTOLIC BLOOD PRESSURE: 82 MMHG | HEART RATE: 90 BPM | HEIGHT: 64 IN | WEIGHT: 189.4 LBS

## 2017-05-30 DIAGNOSIS — G43.711 CHRONIC MIGRAINE WITHOUT AURA, WITH INTRACTABLE MIGRAINE, SO STATED, WITH STATUS MIGRAINOSUS: Primary | ICD-10-CM

## 2017-05-30 PROCEDURE — 99214 OFFICE O/P EST MOD 30 MIN: CPT | Performed by: PSYCHIATRY & NEUROLOGY

## 2017-05-30 RX ORDER — CEFUROXIME AXETIL 250 MG/1
TABLET ORAL
COMMUNITY
Start: 2017-04-27 | End: 2018-10-03 | Stop reason: SDUPTHER

## 2017-07-30 ENCOUNTER — PROCEDURE VISIT (OUTPATIENT)
Dept: NEUROLOGY | Age: 57
End: 2017-07-30
Payer: MEDICARE

## 2017-07-30 DIAGNOSIS — G43.711 CHRONIC MIGRAINE WITHOUT AURA, WITH INTRACTABLE MIGRAINE, SO STATED, WITH STATUS MIGRAINOSUS: Primary | ICD-10-CM

## 2017-07-30 PROCEDURE — 64615 CHEMODENERV MUSC MIGRAINE: CPT | Performed by: PSYCHIATRY & NEUROLOGY

## 2017-08-29 ENCOUNTER — TELEPHONE (OUTPATIENT)
Dept: NEUROLOGY | Age: 57
End: 2017-08-29

## 2017-09-09 ENCOUNTER — HOSPITAL ENCOUNTER (EMERGENCY)
Age: 57
Discharge: HOME | End: 2017-09-09
Attending: EMERGENCY MEDICINE
Payer: MEDICARE

## 2017-09-09 VITALS
DIASTOLIC BLOOD PRESSURE: 48 MMHG | HEART RATE: 72 BPM | RESPIRATION RATE: 22 BRPM | TEMPERATURE: 98.2 F | SYSTOLIC BLOOD PRESSURE: 92 MMHG | OXYGEN SATURATION: 95 % | HEIGHT: 63 IN | BODY MASS INDEX: 33.66 KG/M2 | WEIGHT: 190 LBS

## 2017-09-09 DIAGNOSIS — J44.1 CHRONIC OBSTRUCTIVE PULMONARY DISEASE WITH ACUTE EXACERBATION (HCC): ICD-10-CM

## 2017-09-09 DIAGNOSIS — J40 BRONCHITIS: Primary | ICD-10-CM

## 2017-09-09 PROCEDURE — 99283 EMERGENCY DEPT VISIT LOW MDM: CPT

## 2017-09-09 PROCEDURE — 6360000002 HC RX W HCPCS

## 2017-09-09 PROCEDURE — 6370000000 HC RX 637 (ALT 250 FOR IP): Performed by: EMERGENCY MEDICINE

## 2017-09-09 RX ORDER — ALBUTEROL SULFATE 2.5 MG/3ML
2.5 SOLUTION RESPIRATORY (INHALATION) EVERY 6 HOURS PRN
Qty: 50 EACH | Refills: 0 | Status: SHIPPED | OUTPATIENT
Start: 2017-09-09 | End: 2021-06-08

## 2017-09-09 RX ORDER — ALBUTEROL SULFATE 90 UG/1
2 AEROSOL, METERED RESPIRATORY (INHALATION) ONCE
Status: COMPLETED | OUTPATIENT
Start: 2017-09-09 | End: 2017-09-09

## 2017-09-09 RX ORDER — DOXYCYCLINE 100 MG/1
100 TABLET ORAL 2 TIMES DAILY
Qty: 14 TABLET | Refills: 0 | Status: SHIPPED | OUTPATIENT
Start: 2017-09-09 | End: 2017-09-16

## 2017-09-09 RX ORDER — IPRATROPIUM BROMIDE AND ALBUTEROL SULFATE 2.5; .5 MG/3ML; MG/3ML
1 SOLUTION RESPIRATORY (INHALATION) ONCE
Status: COMPLETED | OUTPATIENT
Start: 2017-09-09 | End: 2017-09-09

## 2017-09-09 RX ORDER — ALBUTEROL SULFATE 2.5 MG/3ML
SOLUTION RESPIRATORY (INHALATION)
Status: COMPLETED
Start: 2017-09-09 | End: 2017-09-09

## 2017-09-09 RX ORDER — PREDNISONE 50 MG/1
50 TABLET ORAL DAILY
Qty: 4 TABLET | Refills: 0 | Status: SHIPPED | OUTPATIENT
Start: 2017-09-09 | End: 2017-09-13

## 2017-09-09 RX ORDER — DOXYCYCLINE HYCLATE 100 MG
100 TABLET ORAL ONCE
Status: COMPLETED | OUTPATIENT
Start: 2017-09-09 | End: 2017-09-09

## 2017-09-09 RX ORDER — PREDNISONE 20 MG/1
60 TABLET ORAL ONCE
Status: COMPLETED | OUTPATIENT
Start: 2017-09-09 | End: 2017-09-09

## 2017-09-09 RX ADMIN — DOXYCYCLINE HYCLATE 100 MG: 100 TABLET, COATED ORAL at 02:00

## 2017-09-09 RX ADMIN — IPRATROPIUM BROMIDE AND ALBUTEROL SULFATE 1 AMPULE: .5; 3 SOLUTION RESPIRATORY (INHALATION) at 01:59

## 2017-09-09 RX ADMIN — ALBUTEROL SULFATE 2 PUFF: 90 AEROSOL, METERED RESPIRATORY (INHALATION) at 03:00

## 2017-09-09 RX ADMIN — PREDNISONE 60 MG: 20 TABLET ORAL at 01:59

## 2017-09-09 RX ADMIN — ALBUTEROL SULFATE 2.5 MG: 2.5 SOLUTION RESPIRATORY (INHALATION) at 01:08

## 2017-09-09 ASSESSMENT — ENCOUNTER SYMPTOMS
COUGH: 1
RHINORRHEA: 0
SHORTNESS OF BREATH: 0
VOMITING: 0
ABDOMINAL PAIN: 0
BACK PAIN: 0
NAUSEA: 0
DIARRHEA: 0
SORE THROAT: 0
EYE PAIN: 0

## 2017-09-09 ASSESSMENT — PAIN DESCRIPTION - LOCATION: LOCATION: HEAD

## 2017-09-09 ASSESSMENT — PAIN DESCRIPTION - PAIN TYPE: TYPE: ACUTE PAIN

## 2017-09-09 ASSESSMENT — PAIN SCALES - GENERAL: PAINLEVEL_OUTOF10: 6

## 2017-09-26 ENCOUNTER — TELEPHONE (OUTPATIENT)
Dept: NEUROLOGY | Age: 57
End: 2017-09-26

## 2017-10-19 RX ORDER — TOPIRAMATE 50 MG/1
TABLET, FILM COATED ORAL
Qty: 75 TABLET | Refills: 0 | Status: SHIPPED | OUTPATIENT
Start: 2017-10-19 | End: 2017-11-15 | Stop reason: SDUPTHER

## 2017-11-18 ENCOUNTER — OFFICE VISIT (OUTPATIENT)
Dept: NEUROLOGY | Age: 57
End: 2017-11-18
Payer: MEDICARE

## 2017-11-18 VITALS
BODY MASS INDEX: 35.05 KG/M2 | HEART RATE: 82 BPM | SYSTOLIC BLOOD PRESSURE: 124 MMHG | HEIGHT: 63 IN | WEIGHT: 197.8 LBS | DIASTOLIC BLOOD PRESSURE: 70 MMHG

## 2017-11-18 DIAGNOSIS — G43.711 CHRONIC MIGRAINE WITHOUT AURA, WITH INTRACTABLE MIGRAINE, SO STATED, WITH STATUS MIGRAINOSUS: Primary | ICD-10-CM

## 2017-11-18 PROCEDURE — 4004F PT TOBACCO SCREEN RCVD TLK: CPT | Performed by: PSYCHIATRY & NEUROLOGY

## 2017-11-18 PROCEDURE — G8484 FLU IMMUNIZE NO ADMIN: HCPCS | Performed by: PSYCHIATRY & NEUROLOGY

## 2017-11-18 PROCEDURE — 64615 CHEMODENERV MUSC MIGRAINE: CPT | Performed by: PSYCHIATRY & NEUROLOGY

## 2017-11-18 PROCEDURE — 3017F COLORECTAL CA SCREEN DOC REV: CPT | Performed by: PSYCHIATRY & NEUROLOGY

## 2017-11-18 PROCEDURE — G8427 DOCREV CUR MEDS BY ELIG CLIN: HCPCS | Performed by: PSYCHIATRY & NEUROLOGY

## 2017-11-18 PROCEDURE — G8417 CALC BMI ABV UP PARAM F/U: HCPCS | Performed by: PSYCHIATRY & NEUROLOGY

## 2017-11-18 PROCEDURE — 3014F SCREEN MAMMO DOC REV: CPT | Performed by: PSYCHIATRY & NEUROLOGY

## 2017-11-18 PROCEDURE — 99213 OFFICE O/P EST LOW 20 MIN: CPT | Performed by: PSYCHIATRY & NEUROLOGY

## 2017-11-18 RX ORDER — TOPIRAMATE 50 MG/1
TABLET, FILM COATED ORAL
Qty: 75 TABLET | Refills: 0 | Status: SHIPPED | OUTPATIENT
Start: 2017-11-18 | End: 2018-02-08 | Stop reason: SDUPTHER

## 2017-11-18 NOTE — PROGRESS NOTES
[facility]  [place], [address], [address] [facility]  Ph: [phone] or [phone]  FAX: [phone]    Jack Cristina M.D.  Adi Moraes M.D. Yoly Downing M.D. Margaret Guerra M.D. Indication for treatment: Chronic daily headaches   Consent form was signed. Please see the associated scanned paper. Potential risks and benefits for the procedure were explained to the patient. Procedure: 30-gauge half inch needle was used and 200 U vial Botox was prepared with 4ml 0.9% NS.155 units of Botox were used and 45 units of Botox were discarded.    Botox was injected at 31 different sites as follows:   Order  Muscle  Units injected    A  Corrugator1  10 units at 2 div sites    B  Procerus  5 Units in 1 site    C  Frontalis¹  20 Units div. 4 sites    D  Temporalis¹  40 Units div 8 site    E  Occipitalis¹  30 Units div. 6 sites    F  Cervical paraspinal muscles¹  20 Units div 4 sites    G  Trapezius¹  30 Units div 6 sites    Total Dose  155 Units div 31 sites    2 Dose distributed bilaterally  Blood loss: Less than 1 cc  Complications: none
household work reduced by half of more because of your headaches?  (Do not include days you counted in question 3 where you did not do household work.) 0    On how many days in the last 3 months did you miss family, social or leisure activities because of your headaches? 0    Your level of disability: I    0 to 5 - MIDAS Grade I, Little or no disability  6 to 10 - MIDAS Grade II, Mild disability  11 to 20 - MIDAS Grade III, Moderate disability  21+ - MIDAS Grade IV, Severe disability
without atrophy or fasciculation     Motor function  Normal muscle bulk and tone; normal power 5/5, including fine motor movements     Sensory function Intact to touch, pin, vibration, proprioception     Cerebellar Intact fine motor movement. No involuntary movements or tremors     Reflex function Intact 2+ DTR and symmetric. Negative Babinski     Gait                  Normal station and gait       Assessment Recommendations:  Chronic medically intractable headache    The patient has been having recurrence of the headaches. She will benefit from Botox injections today. She will follow-up with me next 3 months.

## 2018-02-08 RX ORDER — TOPIRAMATE 50 MG/1
TABLET, FILM COATED ORAL
Qty: 75 TABLET | Refills: 0 | Status: SHIPPED | OUTPATIENT
Start: 2018-02-08 | End: 2018-04-29 | Stop reason: SDUPTHER

## 2018-04-24 ENCOUNTER — APPOINTMENT (OUTPATIENT)
Dept: GENERAL RADIOLOGY | Age: 58
DRG: 198 | End: 2018-04-24
Payer: MEDICARE

## 2018-04-24 ENCOUNTER — HOSPITAL ENCOUNTER (INPATIENT)
Age: 58
LOS: 1 days | Discharge: HOME | DRG: 198 | End: 2018-04-25
Attending: EMERGENCY MEDICINE | Admitting: FAMILY MEDICINE
Payer: MEDICARE

## 2018-04-24 DIAGNOSIS — R07.9 CHEST PAIN, UNSPECIFIED TYPE: Primary | ICD-10-CM

## 2018-04-24 LAB
ABSOLUTE EOS #: 0.2 K/UL (ref 0–0.4)
ABSOLUTE IMMATURE GRANULOCYTE: ABNORMAL K/UL (ref 0–0.3)
ABSOLUTE LYMPH #: 4.4 K/UL (ref 1–4.8)
ABSOLUTE MONO #: 0.5 K/UL (ref 0.1–1.2)
ANION GAP SERPL CALCULATED.3IONS-SCNC: 15 MMOL/L (ref 9–17)
BASOPHILS # BLD: 1 % (ref 0–2)
BASOPHILS ABSOLUTE: 0.1 K/UL (ref 0–0.2)
BUN BLDV-MCNC: 8 MG/DL (ref 6–20)
BUN/CREAT BLD: ABNORMAL (ref 9–20)
CALCIUM SERPL-MCNC: 9.2 MG/DL (ref 8.6–10.4)
CHLORIDE BLD-SCNC: 102 MMOL/L (ref 98–107)
CO2: 26 MMOL/L (ref 20–31)
CREAT SERPL-MCNC: 0.6 MG/DL (ref 0.5–0.9)
DIFFERENTIAL TYPE: ABNORMAL
EKG ATRIAL RATE: 71 BPM
EKG P AXIS: 63 DEGREES
EKG P-R INTERVAL: 152 MS
EKG Q-T INTERVAL: 388 MS
EKG QRS DURATION: 84 MS
EKG QTC CALCULATION (BAZETT): 421 MS
EKG R AXIS: 55 DEGREES
EKG T AXIS: 39 DEGREES
EKG VENTRICULAR RATE: 71 BPM
EOSINOPHILS RELATIVE PERCENT: 2 % (ref 1–4)
GFR AFRICAN AMERICAN: >60 ML/MIN
GFR NON-AFRICAN AMERICAN: >60 ML/MIN
GFR SERPL CREATININE-BSD FRML MDRD: ABNORMAL ML/MIN/{1.73_M2}
GFR SERPL CREATININE-BSD FRML MDRD: ABNORMAL ML/MIN/{1.73_M2}
GLUCOSE BLD-MCNC: 110 MG/DL (ref 70–99)
HCT VFR BLD CALC: 47.1 % (ref 36–46)
HEMOGLOBIN: 15.7 G/DL (ref 12–16)
IMMATURE GRANULOCYTES: ABNORMAL %
INR BLD: 1
LYMPHOCYTES # BLD: 45 % (ref 24–44)
MCH RBC QN AUTO: 28.7 PG (ref 26–34)
MCHC RBC AUTO-ENTMCNC: 33.4 G/DL (ref 31–37)
MCV RBC AUTO: 85.9 FL (ref 80–100)
MONOCYTES # BLD: 6 % (ref 2–11)
NRBC AUTOMATED: ABNORMAL PER 100 WBC
PARTIAL THROMBOPLASTIN TIME: 25.3 SEC (ref 21.3–31.3)
PDW BLD-RTO: 13.8 % (ref 12.5–15.4)
PLATELET # BLD: 248 K/UL (ref 140–450)
PLATELET ESTIMATE: ABNORMAL
PMV BLD AUTO: 9.2 FL (ref 6–12)
POTASSIUM SERPL-SCNC: 3.9 MMOL/L (ref 3.7–5.3)
PROTHROMBIN TIME: 10.4 SEC (ref 9.4–12.6)
RBC # BLD: 5.48 M/UL (ref 4–5.2)
RBC # BLD: ABNORMAL 10*6/UL
SEG NEUTROPHILS: 46 % (ref 36–66)
SEGMENTED NEUTROPHILS ABSOLUTE COUNT: 4.4 K/UL (ref 1.8–7.7)
SODIUM BLD-SCNC: 143 MMOL/L (ref 135–144)
TROPONIN INTERP: NORMAL
TROPONIN T: <0.03 NG/ML
WBC # BLD: 9.5 K/UL (ref 3.5–11)
WBC # BLD: ABNORMAL 10*3/UL

## 2018-04-24 PROCEDURE — G0378 HOSPITAL OBSERVATION PER HR: HCPCS

## 2018-04-24 PROCEDURE — 84484 ASSAY OF TROPONIN QUANT: CPT

## 2018-04-24 PROCEDURE — 85730 THROMBOPLASTIN TIME PARTIAL: CPT

## 2018-04-24 PROCEDURE — 36415 COLL VENOUS BLD VENIPUNCTURE: CPT

## 2018-04-24 PROCEDURE — 85610 PROTHROMBIN TIME: CPT

## 2018-04-24 PROCEDURE — 6360000002 HC RX W HCPCS: Performed by: EMERGENCY MEDICINE

## 2018-04-24 PROCEDURE — 85025 COMPLETE CBC W/AUTO DIFF WBC: CPT

## 2018-04-24 PROCEDURE — 80048 BASIC METABOLIC PNL TOTAL CA: CPT

## 2018-04-24 PROCEDURE — 93005 ELECTROCARDIOGRAM TRACING: CPT

## 2018-04-24 PROCEDURE — 99285 EMERGENCY DEPT VISIT HI MDM: CPT

## 2018-04-24 PROCEDURE — 1210000000 HC MED SURG R&B

## 2018-04-24 PROCEDURE — 94762 N-INVAS EAR/PLS OXIMTRY CONT: CPT

## 2018-04-24 PROCEDURE — 71045 X-RAY EXAM CHEST 1 VIEW: CPT

## 2018-04-24 PROCEDURE — 96374 THER/PROPH/DIAG INJ IV PUSH: CPT

## 2018-04-24 RX ORDER — POTASSIUM CHLORIDE 7.45 MG/ML
10 INJECTION INTRAVENOUS PRN
Status: DISCONTINUED | OUTPATIENT
Start: 2018-04-24 | End: 2018-04-25 | Stop reason: HOSPADM

## 2018-04-24 RX ORDER — CHLORDIAZEPOXIDE HYDROCHLORIDE AND CLIDINIUM BROMIDE 5; 2.5 MG/1; MG/1
1 CAPSULE ORAL
Status: DISCONTINUED | OUTPATIENT
Start: 2018-04-24 | End: 2018-04-25 | Stop reason: HOSPADM

## 2018-04-24 RX ORDER — ACETAMINOPHEN 325 MG/1
650 TABLET ORAL EVERY 4 HOURS PRN
Status: DISCONTINUED | OUTPATIENT
Start: 2018-04-24 | End: 2018-04-25 | Stop reason: HOSPADM

## 2018-04-24 RX ORDER — DULOXETIN HYDROCHLORIDE 30 MG/1
60 CAPSULE, DELAYED RELEASE ORAL DAILY
Status: DISCONTINUED | OUTPATIENT
Start: 2018-04-24 | End: 2018-04-24

## 2018-04-24 RX ORDER — OXYBUTYNIN CHLORIDE 5 MG/1
15 TABLET, EXTENDED RELEASE ORAL DAILY
Status: DISCONTINUED | OUTPATIENT
Start: 2018-04-25 | End: 2018-04-25 | Stop reason: HOSPADM

## 2018-04-24 RX ORDER — ARIPIPRAZOLE 5 MG/1
10 TABLET ORAL DAILY
Status: DISCONTINUED | OUTPATIENT
Start: 2018-04-25 | End: 2018-04-25 | Stop reason: HOSPADM

## 2018-04-24 RX ORDER — POTASSIUM CHLORIDE 20 MEQ/1
40 TABLET, EXTENDED RELEASE ORAL PRN
Status: DISCONTINUED | OUTPATIENT
Start: 2018-04-24 | End: 2018-04-25 | Stop reason: HOSPADM

## 2018-04-24 RX ORDER — ONDANSETRON 2 MG/ML
4 INJECTION INTRAMUSCULAR; INTRAVENOUS EVERY 6 HOURS PRN
Status: DISCONTINUED | OUTPATIENT
Start: 2018-04-24 | End: 2018-04-25 | Stop reason: HOSPADM

## 2018-04-24 RX ORDER — ROPINIROLE 1 MG/1
0.5 TABLET, FILM COATED ORAL NIGHTLY
Status: DISCONTINUED | OUTPATIENT
Start: 2018-04-25 | End: 2018-04-25 | Stop reason: HOSPADM

## 2018-04-24 RX ORDER — MAGNESIUM SULFATE 1 G/100ML
1 INJECTION INTRAVENOUS PRN
Status: DISCONTINUED | OUTPATIENT
Start: 2018-04-24 | End: 2018-04-25 | Stop reason: HOSPADM

## 2018-04-24 RX ORDER — QUETIAPINE FUMARATE 100 MG/1
100 TABLET, FILM COATED ORAL NIGHTLY
Status: DISCONTINUED | OUTPATIENT
Start: 2018-04-24 | End: 2018-04-25 | Stop reason: HOSPADM

## 2018-04-24 RX ORDER — CLONAZEPAM 0.5 MG/1
0.5 TABLET ORAL 3 TIMES DAILY PRN
Status: DISCONTINUED | OUTPATIENT
Start: 2018-04-24 | End: 2018-04-25 | Stop reason: HOSPADM

## 2018-04-24 RX ORDER — SODIUM CHLORIDE 0.9 % (FLUSH) 0.9 %
10 SYRINGE (ML) INJECTION PRN
Status: DISCONTINUED | OUTPATIENT
Start: 2018-04-24 | End: 2018-04-25 | Stop reason: HOSPADM

## 2018-04-24 RX ORDER — NITROGLYCERIN 0.4 MG/1
0.4 TABLET SUBLINGUAL EVERY 5 MIN PRN
Status: DISCONTINUED | OUTPATIENT
Start: 2018-04-24 | End: 2018-04-25 | Stop reason: HOSPADM

## 2018-04-24 RX ORDER — CALCIUM CARBONATE 200(500)MG
500 TABLET,CHEWABLE ORAL 3 TIMES DAILY PRN
Status: DISCONTINUED | OUTPATIENT
Start: 2018-04-24 | End: 2018-04-25 | Stop reason: HOSPADM

## 2018-04-24 RX ORDER — KETOROLAC TROMETHAMINE 15 MG/ML
15 INJECTION, SOLUTION INTRAMUSCULAR; INTRAVENOUS ONCE
Status: COMPLETED | OUTPATIENT
Start: 2018-04-24 | End: 2018-04-24

## 2018-04-24 RX ORDER — DULOXETIN HYDROCHLORIDE 30 MG/1
90 CAPSULE, DELAYED RELEASE ORAL DAILY
Status: DISCONTINUED | OUTPATIENT
Start: 2018-04-24 | End: 2018-04-25 | Stop reason: HOSPADM

## 2018-04-24 RX ORDER — SODIUM CHLORIDE 0.9 % (FLUSH) 0.9 %
10 SYRINGE (ML) INJECTION EVERY 12 HOURS SCHEDULED
Status: DISCONTINUED | OUTPATIENT
Start: 2018-04-24 | End: 2018-04-25 | Stop reason: HOSPADM

## 2018-04-24 RX ORDER — ENALAPRIL MALEATE 5 MG/1
10 TABLET ORAL DAILY
Status: DISCONTINUED | OUTPATIENT
Start: 2018-04-25 | End: 2018-04-25 | Stop reason: HOSPADM

## 2018-04-24 RX ORDER — KETOROLAC TROMETHAMINE 30 MG/ML
30 INJECTION, SOLUTION INTRAMUSCULAR; INTRAVENOUS EVERY 6 HOURS PRN
Status: DISCONTINUED | OUTPATIENT
Start: 2018-04-25 | End: 2018-04-25 | Stop reason: HOSPADM

## 2018-04-24 RX ORDER — BISACODYL 10 MG
10 SUPPOSITORY, RECTAL RECTAL DAILY PRN
Status: DISCONTINUED | OUTPATIENT
Start: 2018-04-24 | End: 2018-04-25 | Stop reason: HOSPADM

## 2018-04-24 RX ORDER — POTASSIUM CHLORIDE 20MEQ/15ML
40 LIQUID (ML) ORAL PRN
Status: DISCONTINUED | OUTPATIENT
Start: 2018-04-24 | End: 2018-04-25 | Stop reason: HOSPADM

## 2018-04-24 RX ORDER — DOCUSATE SODIUM 100 MG/1
100 CAPSULE, LIQUID FILLED ORAL 2 TIMES DAILY
Status: DISCONTINUED | OUTPATIENT
Start: 2018-04-24 | End: 2018-04-25 | Stop reason: HOSPADM

## 2018-04-24 RX ORDER — NICOTINE 21 MG/24HR
1 PATCH, TRANSDERMAL 24 HOURS TRANSDERMAL DAILY
Status: DISCONTINUED | OUTPATIENT
Start: 2018-04-25 | End: 2018-04-25

## 2018-04-24 RX ORDER — TIZANIDINE 4 MG/1
2 TABLET ORAL EVERY 6 HOURS PRN
Status: DISCONTINUED | OUTPATIENT
Start: 2018-04-24 | End: 2018-04-25 | Stop reason: HOSPADM

## 2018-04-24 RX ORDER — ALBUTEROL SULFATE 90 UG/1
2 AEROSOL, METERED RESPIRATORY (INHALATION) EVERY 6 HOURS PRN
Status: DISCONTINUED | OUTPATIENT
Start: 2018-04-24 | End: 2018-04-25

## 2018-04-24 RX ADMIN — KETOROLAC TROMETHAMINE 15 MG: 15 INJECTION, SOLUTION INTRAMUSCULAR; INTRAVENOUS at 21:43

## 2018-04-24 ASSESSMENT — PAIN DESCRIPTION - LOCATION
LOCATION: BREAST
LOCATION: BREAST;CHEST

## 2018-04-24 ASSESSMENT — PAIN DESCRIPTION - ORIENTATION
ORIENTATION: LEFT

## 2018-04-24 ASSESSMENT — PAIN DESCRIPTION - PAIN TYPE
TYPE: ACUTE PAIN

## 2018-04-24 ASSESSMENT — PAIN SCALES - GENERAL
PAINLEVEL_OUTOF10: 8
PAINLEVEL_OUTOF10: 3
PAINLEVEL_OUTOF10: 9

## 2018-04-24 ASSESSMENT — PAIN DESCRIPTION - PROGRESSION
CLINICAL_PROGRESSION: NOT CHANGED
CLINICAL_PROGRESSION: GRADUALLY IMPROVING

## 2018-04-24 ASSESSMENT — PAIN DESCRIPTION - DESCRIPTORS
DESCRIPTORS: PRESSURE
DESCRIPTORS: ACHING

## 2018-04-25 VITALS
WEIGHT: 196 LBS | SYSTOLIC BLOOD PRESSURE: 105 MMHG | DIASTOLIC BLOOD PRESSURE: 67 MMHG | BODY MASS INDEX: 34.73 KG/M2 | RESPIRATION RATE: 20 BRPM | TEMPERATURE: 98 F | HEIGHT: 63 IN | OXYGEN SATURATION: 91 % | HEART RATE: 80 BPM

## 2018-04-25 LAB
ALBUMIN SERPL-MCNC: 3.6 G/DL (ref 3.5–5.2)
ALBUMIN/GLOBULIN RATIO: 1.4 (ref 1–2.5)
ALP BLD-CCNC: 65 U/L (ref 35–104)
ALT SERPL-CCNC: 8 U/L (ref 5–33)
ANION GAP SERPL CALCULATED.3IONS-SCNC: 15 MMOL/L (ref 9–17)
AST SERPL-CCNC: 13 U/L
BILIRUB SERPL-MCNC: 0.19 MG/DL (ref 0.3–1.2)
BUN BLDV-MCNC: 9 MG/DL (ref 6–20)
BUN/CREAT BLD: ABNORMAL (ref 9–20)
CALCIUM SERPL-MCNC: 8.7 MG/DL (ref 8.6–10.4)
CHLORIDE BLD-SCNC: 103 MMOL/L (ref 98–107)
CHOLESTEROL/HDL RATIO: 5.2
CHOLESTEROL: 253 MG/DL
CO2: 24 MMOL/L (ref 20–31)
CREAT SERPL-MCNC: 0.59 MG/DL (ref 0.5–0.9)
GFR AFRICAN AMERICAN: >60 ML/MIN
GFR NON-AFRICAN AMERICAN: >60 ML/MIN
GFR SERPL CREATININE-BSD FRML MDRD: ABNORMAL ML/MIN/{1.73_M2}
GFR SERPL CREATININE-BSD FRML MDRD: ABNORMAL ML/MIN/{1.73_M2}
GLUCOSE BLD-MCNC: 102 MG/DL (ref 70–99)
HCT VFR BLD CALC: 42.2 % (ref 36–46)
HDLC SERPL-MCNC: 49 MG/DL
HEMOGLOBIN: 14 G/DL (ref 12–16)
LDL CHOLESTEROL: 177 MG/DL (ref 0–130)
MAGNESIUM: 2 MG/DL (ref 1.6–2.6)
MCH RBC QN AUTO: 28.6 PG (ref 26–34)
MCHC RBC AUTO-ENTMCNC: 33.1 G/DL (ref 31–37)
MCV RBC AUTO: 86.4 FL (ref 80–100)
NRBC AUTOMATED: NORMAL PER 100 WBC
PDW BLD-RTO: 14.2 % (ref 12.5–15.4)
PLATELET # BLD: 216 K/UL (ref 140–450)
PMV BLD AUTO: 9.4 FL (ref 6–12)
POTASSIUM SERPL-SCNC: 4 MMOL/L (ref 3.7–5.3)
RBC # BLD: 4.88 M/UL (ref 4–5.2)
SODIUM BLD-SCNC: 142 MMOL/L (ref 135–144)
TOTAL PROTEIN: 6.2 G/DL (ref 6.4–8.3)
TRIGL SERPL-MCNC: 136 MG/DL
TROPONIN INTERP: NORMAL
TROPONIN T: <0.03 NG/ML
VLDLC SERPL CALC-MCNC: ABNORMAL MG/DL (ref 1–30)
WBC # BLD: 6.9 K/UL (ref 3.5–11)

## 2018-04-25 PROCEDURE — 99222 1ST HOSP IP/OBS MODERATE 55: CPT | Performed by: CLINICAL NURSE SPECIALIST

## 2018-04-25 PROCEDURE — 83735 ASSAY OF MAGNESIUM: CPT

## 2018-04-25 PROCEDURE — 80053 COMPREHEN METABOLIC PANEL: CPT

## 2018-04-25 PROCEDURE — 84484 ASSAY OF TROPONIN QUANT: CPT

## 2018-04-25 PROCEDURE — 96375 TX/PRO/DX INJ NEW DRUG ADDON: CPT

## 2018-04-25 PROCEDURE — 85027 COMPLETE CBC AUTOMATED: CPT

## 2018-04-25 PROCEDURE — G0378 HOSPITAL OBSERVATION PER HR: HCPCS

## 2018-04-25 PROCEDURE — 2580000003 HC RX 258: Performed by: NURSE PRACTITIONER

## 2018-04-25 PROCEDURE — 6360000002 HC RX W HCPCS: Performed by: NURSE PRACTITIONER

## 2018-04-25 PROCEDURE — 36415 COLL VENOUS BLD VENIPUNCTURE: CPT

## 2018-04-25 PROCEDURE — 80061 LIPID PANEL: CPT

## 2018-04-25 PROCEDURE — 6370000000 HC RX 637 (ALT 250 FOR IP)

## 2018-04-25 PROCEDURE — 6370000000 HC RX 637 (ALT 250 FOR IP): Performed by: NURSE PRACTITIONER

## 2018-04-25 RX ORDER — ARIPIPRAZOLE 5 MG/1
TABLET ORAL
Status: COMPLETED
Start: 2018-04-25 | End: 2018-04-25

## 2018-04-25 RX ORDER — NICOTINE 21 MG/24HR
PATCH, TRANSDERMAL 24 HOURS TRANSDERMAL
Status: DISPENSED
Start: 2018-04-25 | End: 2018-04-25

## 2018-04-25 RX ORDER — ALBUTEROL SULFATE 90 UG/1
2 AEROSOL, METERED RESPIRATORY (INHALATION) EVERY 6 HOURS PRN
Status: DISCONTINUED | OUTPATIENT
Start: 2018-04-25 | End: 2018-04-25 | Stop reason: HOSPADM

## 2018-04-25 RX ORDER — NICOTINE 21 MG/24HR
1 PATCH, TRANSDERMAL 24 HOURS TRANSDERMAL DAILY
Status: DISCONTINUED | OUTPATIENT
Start: 2018-04-25 | End: 2018-04-25 | Stop reason: HOSPADM

## 2018-04-25 RX ADMIN — QUETIAPINE FUMARATE 100 MG: 100 TABLET ORAL at 00:05

## 2018-04-25 RX ADMIN — ANTACID TABLETS 500 MG: 500 TABLET, CHEWABLE ORAL at 00:10

## 2018-04-25 RX ADMIN — KETOROLAC TROMETHAMINE 30 MG: 30 INJECTION, SOLUTION INTRAMUSCULAR; INTRAVENOUS at 08:23

## 2018-04-25 RX ADMIN — ROPINIROLE HYDROCHLORIDE 0.5 MG: 1 TABLET, FILM COATED ORAL at 00:08

## 2018-04-25 RX ADMIN — DULOXETINE HYDROCHLORIDE 90 MG: 30 CAPSULE, DELAYED RELEASE ORAL at 00:05

## 2018-04-25 RX ADMIN — ARIPIPRAZOLE 10 MG: 5 TABLET ORAL at 00:04

## 2018-04-25 RX ADMIN — ARIPIPRAZOLE: 5 TABLET ORAL at 00:15

## 2018-04-25 RX ADMIN — Medication 10 ML: at 08:24

## 2018-04-25 ASSESSMENT — PAIN DESCRIPTION - DESCRIPTORS
DESCRIPTORS: ACHING
DESCRIPTORS: ACHING;CONSTANT

## 2018-04-25 ASSESSMENT — PAIN DESCRIPTION - PAIN TYPE
TYPE: CHRONIC PAIN
TYPE: CHRONIC PAIN

## 2018-04-25 ASSESSMENT — PAIN DESCRIPTION - FREQUENCY: FREQUENCY: CONTINUOUS

## 2018-04-25 ASSESSMENT — PAIN DESCRIPTION - PROGRESSION
CLINICAL_PROGRESSION: GRADUALLY IMPROVING
CLINICAL_PROGRESSION: GRADUALLY IMPROVING
CLINICAL_PROGRESSION: NOT CHANGED
CLINICAL_PROGRESSION: GRADUALLY IMPROVING
CLINICAL_PROGRESSION: NOT CHANGED
CLINICAL_PROGRESSION: GRADUALLY IMPROVING
CLINICAL_PROGRESSION: GRADUALLY IMPROVING
CLINICAL_PROGRESSION: NOT CHANGED
CLINICAL_PROGRESSION: GRADUALLY IMPROVING

## 2018-04-25 ASSESSMENT — PAIN SCALES - GENERAL
PAINLEVEL_OUTOF10: 5
PAINLEVEL_OUTOF10: 4
PAINLEVEL_OUTOF10: 8

## 2018-04-25 ASSESSMENT — PAIN SCALES - WONG BAKER
WONGBAKER_NUMERICALRESPONSE: 4
WONGBAKER_NUMERICALRESPONSE: 4

## 2018-04-25 ASSESSMENT — PAIN DESCRIPTION - LOCATION
LOCATION: NECK
LOCATION: NECK

## 2018-04-25 ASSESSMENT — PAIN DESCRIPTION - ONSET: ONSET: ON-GOING

## 2018-04-25 ASSESSMENT — PAIN DESCRIPTION - ORIENTATION: ORIENTATION: POSTERIOR

## 2018-04-29 ENCOUNTER — TELEPHONE (OUTPATIENT)
Dept: NEUROLOGY | Age: 58
End: 2018-04-29

## 2018-04-29 ENCOUNTER — PROCEDURE VISIT (OUTPATIENT)
Dept: NEUROLOGY | Age: 58
End: 2018-04-29
Payer: MEDICARE

## 2018-04-29 DIAGNOSIS — G43.711 CHRONIC MIGRAINE WITHOUT AURA, WITH INTRACTABLE MIGRAINE, SO STATED, WITH STATUS MIGRAINOSUS: Primary | ICD-10-CM

## 2018-04-29 PROCEDURE — 64615 CHEMODENERV MUSC MIGRAINE: CPT | Performed by: PSYCHIATRY & NEUROLOGY

## 2018-04-29 RX ORDER — TOPIRAMATE 50 MG/1
TABLET, FILM COATED ORAL
Qty: 225 TABLET | Refills: 3 | Status: SHIPPED | OUTPATIENT
Start: 2018-04-29 | End: 2018-07-11 | Stop reason: ALTCHOICE

## 2018-07-11 ENCOUNTER — OFFICE VISIT (OUTPATIENT)
Dept: NEUROLOGY | Age: 58
End: 2018-07-11
Payer: MEDICARE

## 2018-07-11 VITALS
DIASTOLIC BLOOD PRESSURE: 74 MMHG | BODY MASS INDEX: 33.45 KG/M2 | HEART RATE: 97 BPM | SYSTOLIC BLOOD PRESSURE: 113 MMHG | WEIGHT: 188.8 LBS | HEIGHT: 63 IN

## 2018-07-11 DIAGNOSIS — G43.009 MIGRAINE WITHOUT AURA AND WITHOUT STATUS MIGRAINOSUS, NOT INTRACTABLE: ICD-10-CM

## 2018-07-11 DIAGNOSIS — G43.D0 ABDOMINAL MIGRAINE, NOT INTRACTABLE: ICD-10-CM

## 2018-07-11 PROCEDURE — 99214 OFFICE O/P EST MOD 30 MIN: CPT | Performed by: NURSE PRACTITIONER

## 2018-07-11 PROCEDURE — G8427 DOCREV CUR MEDS BY ELIG CLIN: HCPCS | Performed by: NURSE PRACTITIONER

## 2018-07-11 PROCEDURE — G8417 CALC BMI ABV UP PARAM F/U: HCPCS | Performed by: NURSE PRACTITIONER

## 2018-07-11 PROCEDURE — 4004F PT TOBACCO SCREEN RCVD TLK: CPT | Performed by: NURSE PRACTITIONER

## 2018-07-11 PROCEDURE — 3017F COLORECTAL CA SCREEN DOC REV: CPT | Performed by: NURSE PRACTITIONER

## 2018-07-11 RX ORDER — RIZATRIPTAN BENZOATE 5 MG/1
TABLET, ORALLY DISINTEGRATING ORAL
Qty: 10 TABLET | Refills: 3 | Status: SHIPPED | OUTPATIENT
Start: 2018-07-11 | End: 2021-02-18 | Stop reason: SDUPTHER

## 2018-07-11 NOTE — PROGRESS NOTES
[facility]            [address]          [place], [address] [facility]          Dept: [phone]          Dept Fax: [phone]        MD Reed Warner MD Ahmed B. Delight Garret, MD Isabel Pond, MD Mitzi Ngo, MD Rawland Redwood, CNP            7/11/2018    HPI:      Your patient, Burleigh Crigler returns for continuing neurologic care. Patient is a 53-year-old woman who was seen last on May 28, 2018 for Botox injections for treatment of her migraine headaches. Patient has had no headaches since her visit with Rosi Shadow actually stopped taking Topamax over 4 months ago. Patient had been taking 2 mg in the morning and 100 mg in the evening, but since she had no headaches, she had stopped taking the tablets. Patient typically has migraine headaches without aura and will use Fioricet typically to abort those headaches. Patient also has abdominal migraines and will typically take Maxalt and Phenergan to relieve those symptoms. Patient is doing well and reports no ill effects to her medications. Patient denies blurred or double vision, weakness numbness or tingling in her extremities, gait or balance difficulties.                   Past Medical History:   Diagnosis Date    Asthma     Back injury     Cataracts, bilateral     COPD (chronic obstructive pulmonary disease) ([facility] [address] 8/13/2015    Gastroesophageal reflux disease without esophagitis 8/13/2015    H/O blood clots     Headache(784.0)     Heart attack     Hypertension     Knee injury     Osteoarthritis     Psoriasis          Past Surgical History:   Procedure Laterality Date    CERVIX SURGERY  1997/1998    ELBOW SURGERY      KNEE SURGERY      LEEP         Family History   Problem Relation Age of Onset    Heart Disease Mother     High Blood Pressure Mother     Heart Disease Maternal Grandfather     Stroke Maternal Grandfather        Social History   Substance Use Topics    Smoking status: Current Every Day Smoker     Packs/day: 2.00     Years: 45.00     Types: Cigarettes    Smokeless tobacco: Never Used      Comment: uses the e-vape    Alcohol use No                               REVIEW OF SYSTEMS    CONSTITUTIONAL Weight: absent, Appetite: absent, Fatigue: present      HEENT Ears: normal, Visual disturbance: absent   RESPIRATORY Shortness of breath: absent, Cough: absent   CARDIOVASCULAR Chest pain: absent, Leg swelling :absent      GI Constipation: absent, Diarrhea: absent, Swallowing change: absent       Urinary frequency: absent, Urinary urgency: absent, Urinary incontinence: absent   MUSCULOSKELETAL Neck pain: present, Back pain: present, Stiffness: present, Muscle pain: present, Joint pain: present Restless legs: present   DERMATOLOGIC Hair loss: absent, Skin changes: absent   NEUROLOGIC Memory loss: absent, Confusion: absent, Seizures: absent Trouble walking or imbalance: absent, Dizziness: absent, Weakness: absent, Numbness: absent Tremor: absent, Spasm: absent, Speech difficulty: absent, Headache: absent, Light sensitivity: absent   PSYCHIATRIC Anxiety: present, Hallucination: absent, Mood disorder: absent   HEMATOLOGIC Abnormal bleeding: absent, Anemia: absent, Clotting disorder: absent, Lymph gland changes: absent           Allergies   Allergen Reactions    Aspartame And Phenylalanine Other (See Comments)     Headache    Benadryl [Diphenhydramine] Other (See Comments)     Shaking and Restless Legs    Celebrex [Celecoxib] Other (See Comments)     Upset Stomach, Nausea, Vomiting    Demerol Hcl [Meperidine] Other (See Comments)     Makes the patient feel \"out of in\"     Gabapentin Other (See Comments)     Violent behavior    Morphine Other (See Comments)     hallucinations           Current Outpatient Prescriptions   Medication Sig Dispense Refill    rizatriptan (MAXALT-MLT) 5 MG disintegrating tablet normal    Tremor   Resting tremor: absent    Reflexes   Right brachioradialis: 2+  Left brachioradialis: 2+  Right biceps: 2+  Left biceps: 2+  Right triceps: 2+  Left triceps: 2+  Right patellar: 2+  Left patellar: 2+  Right achilles: 2+  Left achilles: 2+  Right plantar: normal  Left plantar: normal            ASSESSMENT/PLAN:       In summary, your patient, Pablo Parks exhibits the following, with associated plan:    1. Migraine headaches without aura, currently well controlled  1. Patient discontinued Topamax. I advised her that if her headaches really turn that we will need to titrate her back up on the Topamax at that point. 2. She will continue the use of Imitrex or Fioricet to abort her migraine headaches  3. She will return for her Botox injections in August  2. Abdominal migraines, which are infrequent  1.  Continue to use MaxaltMLT and phenergan for abortive therapy            Signed: Grecia Newman CNP      *Please note that portions of this note were completed with a voice recognition program.  Although every effort was made to insure the accuracy of this automated transcription, some errors in transcription may have occurred, occasionally words and are mis-transcribed

## 2018-08-03 ENCOUNTER — TELEPHONE (OUTPATIENT)
Dept: NEUROLOGY | Age: 58
End: 2018-08-03

## 2018-08-13 ENCOUNTER — PROCEDURE VISIT (OUTPATIENT)
Dept: NEUROLOGY | Age: 58
End: 2018-08-13
Payer: MEDICARE

## 2018-08-13 DIAGNOSIS — G43.711 CHRONIC MIGRAINE WITHOUT AURA, WITH INTRACTABLE MIGRAINE, SO STATED, WITH STATUS MIGRAINOSUS: Primary | ICD-10-CM

## 2018-08-13 PROCEDURE — 64615 CHEMODENERV MUSC MIGRAINE: CPT | Performed by: PSYCHIATRY & NEUROLOGY

## 2018-10-03 ENCOUNTER — OFFICE VISIT (OUTPATIENT)
Dept: NEUROLOGY | Age: 58
End: 2018-10-03
Payer: MEDICARE

## 2018-10-03 ENCOUNTER — TELEPHONE (OUTPATIENT)
Dept: NEUROLOGY | Age: 58
End: 2018-10-03

## 2018-10-03 VITALS
BODY MASS INDEX: 34.94 KG/M2 | SYSTOLIC BLOOD PRESSURE: 138 MMHG | WEIGHT: 197.2 LBS | HEART RATE: 105 BPM | HEIGHT: 63 IN | DIASTOLIC BLOOD PRESSURE: 82 MMHG

## 2018-10-03 DIAGNOSIS — G43.D0 ABDOMINAL MIGRAINE, NOT INTRACTABLE: ICD-10-CM

## 2018-10-03 DIAGNOSIS — G43.009 MIGRAINE WITHOUT AURA AND WITHOUT STATUS MIGRAINOSUS, NOT INTRACTABLE: Primary | ICD-10-CM

## 2018-10-03 PROCEDURE — G8417 CALC BMI ABV UP PARAM F/U: HCPCS | Performed by: NURSE PRACTITIONER

## 2018-10-03 PROCEDURE — 3017F COLORECTAL CA SCREEN DOC REV: CPT | Performed by: NURSE PRACTITIONER

## 2018-10-03 PROCEDURE — 99214 OFFICE O/P EST MOD 30 MIN: CPT | Performed by: NURSE PRACTITIONER

## 2018-10-03 PROCEDURE — G8484 FLU IMMUNIZE NO ADMIN: HCPCS | Performed by: NURSE PRACTITIONER

## 2018-10-03 PROCEDURE — G8427 DOCREV CUR MEDS BY ELIG CLIN: HCPCS | Performed by: NURSE PRACTITIONER

## 2018-10-03 PROCEDURE — 4004F PT TOBACCO SCREEN RCVD TLK: CPT | Performed by: NURSE PRACTITIONER

## 2018-10-03 RX ORDER — CEFUROXIME AXETIL 250 MG/1
TABLET ORAL
Qty: 9 CARTRIDGE | Refills: 3 | Status: SHIPPED | OUTPATIENT
Start: 2018-10-03

## 2018-10-03 RX ORDER — TOPIRAMATE 50 MG/1
150 TABLET, FILM COATED ORAL DAILY
Qty: 90 TABLET | Refills: 3 | Status: SHIPPED | OUTPATIENT
Start: 2018-10-03 | End: 2019-02-06 | Stop reason: SDUPTHER

## 2018-10-03 NOTE — PROGRESS NOTES
 Headache(784.0)     Heart attack ([facility] [address]     Hypertension     Knee injury     Osteoarthritis     Psoriasis     PTSD (post-traumatic stress disorder)          Past Surgical History:   Procedure Laterality Date    CERVIX SURGERY  1997/1998    ELBOW SURGERY      KNEE SURGERY      LEEP      NERVE BLOCK  2018       Family History   Problem Relation Age of Onset    Heart Disease Mother     High Blood Pressure Mother     Heart Disease Maternal Grandfather     Stroke Maternal Grandfather        Social History   Substance Use Topics    Smoking status: Current Every Day Smoker     Packs/day: 0.25     Years: 45.00     Types: Cigarettes    Smokeless tobacco: Never Used      Comment: uses the e-vape    Alcohol use No                               REVIEW OF SYSTEMS    CONSTITUTIONAL Weight: absent, Appetite: absent, Fatigue: absent      HEENT Ears: normal, Visual disturbance: present   RESPIRATORY Shortness of breath: absent, Cough: absent   CARDIOVASCULAR Chest pain: absent, Leg swelling :absent      GI Constipation: absent, Diarrhea: absent, Swallowing change: absent       Urinary frequency: absent, Urinary urgency: absent, Urinary incontinence: absent   MUSCULOSKELETAL Neck pain: present, Back pain: absent, Stiffness: present, Muscle pain: present, Joint pain: absent Restless legs: absent   DERMATOLOGIC Hair loss: absent, Skin changes: absent   NEUROLOGIC Memory loss: absent, Confusion: absent, Seizures: absent Trouble walking or imbalance: absent, Dizziness: absent, Weakness: absent, Numbness: absent Tremor: absent, Spasm: absent, Speech difficulty: absent, Headache: present, Light sensitivity: present   PSYCHIATRIC Anxiety: absent, Hallucination: absent, Mood disorder: absent   HEMATOLOGIC Abnormal bleeding: absent, Anemia: absent, Clotting disorder: absent, Lymph gland changes: absent           Allergies   Allergen Reactions    Aspartame And Phenylalanine Other (See Comments)     Headache    Benadryl

## 2018-11-27 ENCOUNTER — APPOINTMENT (OUTPATIENT)
Dept: CT IMAGING | Age: 58
DRG: 204 | End: 2018-11-27
Payer: MEDICARE

## 2018-11-27 ENCOUNTER — APPOINTMENT (OUTPATIENT)
Dept: GENERAL RADIOLOGY | Age: 58
DRG: 204 | End: 2018-11-27
Payer: MEDICARE

## 2018-11-27 ENCOUNTER — HOSPITAL ENCOUNTER (INPATIENT)
Age: 58
LOS: 1 days | Discharge: HOME | DRG: 204 | End: 2018-11-28
Attending: EMERGENCY MEDICINE | Admitting: INTERNAL MEDICINE
Payer: MEDICARE

## 2018-11-27 DIAGNOSIS — R55 SYNCOPE AND COLLAPSE: Primary | ICD-10-CM

## 2018-11-27 PROBLEM — I25.10 CORONARY ARTERY DISEASE INVOLVING NATIVE CORONARY ARTERY OF NATIVE HEART WITHOUT ANGINA PECTORIS: Status: ACTIVE | Noted: 2018-11-27

## 2018-11-27 PROBLEM — I95.1 ORTHOSTATIC HYPOTENSION: Status: ACTIVE | Noted: 2018-11-27

## 2018-11-27 LAB
-: NORMAL
ABSOLUTE EOS #: 0.25 K/UL (ref 0–0.44)
ABSOLUTE IMMATURE GRANULOCYTE: 0.03 K/UL (ref 0–0.3)
ABSOLUTE LYMPH #: 4.08 K/UL (ref 1.1–3.7)
ABSOLUTE MONO #: 0.59 K/UL (ref 0.1–1.2)
ALBUMIN SERPL-MCNC: 3.2 G/DL (ref 3.5–5.2)
ALBUMIN/GLOBULIN RATIO: 1.4 (ref 1–2.5)
ALP BLD-CCNC: 64 U/L (ref 35–104)
ALT SERPL-CCNC: 9 U/L (ref 5–33)
AMORPHOUS: NORMAL
ANION GAP SERPL CALCULATED.3IONS-SCNC: 9 MMOL/L (ref 9–17)
AST SERPL-CCNC: 17 U/L
BACTERIA: NORMAL
BASOPHILS # BLD: 1 % (ref 0–2)
BASOPHILS ABSOLUTE: 0.07 K/UL (ref 0–0.2)
BILIRUB SERPL-MCNC: <0.1 MG/DL (ref 0.3–1.2)
BILIRUBIN URINE: NEGATIVE
BNP INTERPRETATION: ABNORMAL
BUN BLDV-MCNC: 11 MG/DL (ref 6–20)
BUN/CREAT BLD: ABNORMAL (ref 9–20)
CALCIUM SERPL-MCNC: 7.9 MG/DL (ref 8.6–10.4)
CASTS UA: NORMAL /LPF (ref 0–8)
CHLORIDE BLD-SCNC: 108 MMOL/L (ref 98–107)
CO2: 20 MMOL/L (ref 20–31)
COLOR: YELLOW
CREAT SERPL-MCNC: 0.83 MG/DL (ref 0.5–0.9)
CRYSTALS, UA: NORMAL /HPF
CULTURE: NORMAL
DIFFERENTIAL TYPE: ABNORMAL
EKG ATRIAL RATE: 50 BPM
EKG P AXIS: 52 DEGREES
EKG P-R INTERVAL: 158 MS
EKG Q-T INTERVAL: 512 MS
EKG QRS DURATION: 88 MS
EKG QTC CALCULATION (BAZETT): 466 MS
EKG R AXIS: 43 DEGREES
EKG T AXIS: 28 DEGREES
EKG VENTRICULAR RATE: 50 BPM
EOSINOPHILS RELATIVE PERCENT: 3 % (ref 1–4)
EPITHELIAL CELLS UA: NORMAL /HPF (ref 0–5)
GFR AFRICAN AMERICAN: >60 ML/MIN
GFR NON-AFRICAN AMERICAN: >60 ML/MIN
GFR SERPL CREATININE-BSD FRML MDRD: ABNORMAL ML/MIN/{1.73_M2}
GFR SERPL CREATININE-BSD FRML MDRD: ABNORMAL ML/MIN/{1.73_M2}
GLUCOSE BLD-MCNC: 122 MG/DL (ref 70–99)
GLUCOSE URINE: NEGATIVE
HCT VFR BLD CALC: 31.1 % (ref 36.3–47.1)
HEMOGLOBIN: 10.2 G/DL (ref 11.9–15.1)
IMMATURE GRANULOCYTES: 0 %
INR BLD: 1
KETONES, URINE: NEGATIVE
LACTIC ACID, WHOLE BLOOD: 0.7 MMOL/L (ref 0.7–2.1)
LACTIC ACID, WHOLE BLOOD: 1.3 MMOL/L (ref 0.7–2.1)
LEUKOCYTE ESTERASE, URINE: NEGATIVE
LYMPHOCYTES # BLD: 43 % (ref 24–43)
Lab: NORMAL
MAGNESIUM: 1.9 MG/DL (ref 1.6–2.6)
MCH RBC QN AUTO: 28.7 PG (ref 25.2–33.5)
MCHC RBC AUTO-ENTMCNC: 32.8 G/DL (ref 28.4–34.8)
MCV RBC AUTO: 87.6 FL (ref 82.6–102.9)
MONOCYTES # BLD: 6 % (ref 3–12)
MUCUS: NORMAL
NITRITE, URINE: NEGATIVE
NRBC AUTOMATED: 0 PER 100 WBC
OTHER OBSERVATIONS UA: NORMAL
PARTIAL THROMBOPLASTIN TIME: 23.8 SEC (ref 20.5–30.5)
PDW BLD-RTO: 13.1 % (ref 11.8–14.4)
PH UA: 7 (ref 5–8)
PLATELET # BLD: 203 K/UL (ref 138–453)
PLATELET ESTIMATE: ABNORMAL
PMV BLD AUTO: 11.6 FL (ref 8.1–13.5)
POC TROPONIN I: 0.03 NG/ML (ref 0–0.1)
POC TROPONIN I: 0.03 NG/ML (ref 0–0.1)
POC TROPONIN INTERP: NORMAL
POC TROPONIN INTERP: NORMAL
POTASSIUM SERPL-SCNC: 3.5 MMOL/L (ref 3.7–5.3)
PRO-BNP: 339 PG/ML
PROTEIN UA: NEGATIVE
PROTHROMBIN TIME: 11.1 SEC (ref 9–12)
RBC # BLD: 3.55 M/UL (ref 3.95–5.11)
RBC # BLD: ABNORMAL 10*6/UL
RBC UA: NORMAL /HPF (ref 0–4)
RENAL EPITHELIAL, UA: NORMAL /HPF
SEG NEUTROPHILS: 47 % (ref 36–65)
SEGMENTED NEUTROPHILS ABSOLUTE COUNT: 4.58 K/UL (ref 1.5–8.1)
SODIUM BLD-SCNC: 137 MMOL/L (ref 135–144)
SPECIFIC GRAVITY UA: 1.01 (ref 1–1.03)
SPECIMEN DESCRIPTION: NORMAL
STATUS: NORMAL
TOTAL PROTEIN: 5.5 G/DL (ref 6.4–8.3)
TRICHOMONAS: NORMAL
TROPONIN INTERP: NORMAL
TROPONIN T: <0.03 NG/ML
TURBIDITY: CLEAR
URINE HGB: NEGATIVE
UROBILINOGEN, URINE: NORMAL
WBC # BLD: 9.6 K/UL (ref 3.5–11.3)
WBC # BLD: ABNORMAL 10*3/UL
WBC UA: NORMAL /HPF (ref 0–5)
YEAST: NORMAL

## 2018-11-27 PROCEDURE — G8980 MOBILITY D/C STATUS: HCPCS

## 2018-11-27 PROCEDURE — 83880 ASSAY OF NATRIURETIC PEPTIDE: CPT

## 2018-11-27 PROCEDURE — 2580000003 HC RX 258: Performed by: NURSE PRACTITIONER

## 2018-11-27 PROCEDURE — 6370000000 HC RX 637 (ALT 250 FOR IP): Performed by: NURSE PRACTITIONER

## 2018-11-27 PROCEDURE — 71045 X-RAY EXAM CHEST 1 VIEW: CPT

## 2018-11-27 PROCEDURE — 71275 CT ANGIOGRAPHY CHEST: CPT

## 2018-11-27 PROCEDURE — 97162 PT EVAL MOD COMPLEX 30 MIN: CPT

## 2018-11-27 PROCEDURE — 83735 ASSAY OF MAGNESIUM: CPT

## 2018-11-27 PROCEDURE — 99291 CRITICAL CARE FIRST HOUR: CPT

## 2018-11-27 PROCEDURE — 80053 COMPREHEN METABOLIC PANEL: CPT

## 2018-11-27 PROCEDURE — 72125 CT NECK SPINE W/O DYE: CPT

## 2018-11-27 PROCEDURE — 2060000000 HC ICU INTERMEDIATE R&B

## 2018-11-27 PROCEDURE — 6360000002 HC RX W HCPCS: Performed by: INTERNAL MEDICINE

## 2018-11-27 PROCEDURE — 6360000002 HC RX W HCPCS: Performed by: EMERGENCY MEDICINE

## 2018-11-27 PROCEDURE — 85610 PROTHROMBIN TIME: CPT

## 2018-11-27 PROCEDURE — 84484 ASSAY OF TROPONIN QUANT: CPT

## 2018-11-27 PROCEDURE — 83605 ASSAY OF LACTIC ACID: CPT

## 2018-11-27 PROCEDURE — 85025 COMPLETE CBC W/AUTO DIFF WBC: CPT

## 2018-11-27 PROCEDURE — 81001 URINALYSIS AUTO W/SCOPE: CPT

## 2018-11-27 PROCEDURE — 93005 ELECTROCARDIOGRAM TRACING: CPT

## 2018-11-27 PROCEDURE — G8979 MOBILITY GOAL STATUS: HCPCS

## 2018-11-27 PROCEDURE — 70450 CT HEAD/BRAIN W/O DYE: CPT

## 2018-11-27 PROCEDURE — 96375 TX/PRO/DX INJ NEW DRUG ADDON: CPT

## 2018-11-27 PROCEDURE — 87040 BLOOD CULTURE FOR BACTERIA: CPT

## 2018-11-27 PROCEDURE — 2580000003 HC RX 258: Performed by: EMERGENCY MEDICINE

## 2018-11-27 PROCEDURE — 74174 CTA ABD&PLVS W/CONTRAST: CPT

## 2018-11-27 PROCEDURE — 96374 THER/PROPH/DIAG INJ IV PUSH: CPT

## 2018-11-27 PROCEDURE — 87086 URINE CULTURE/COLONY COUNT: CPT

## 2018-11-27 PROCEDURE — G8978 MOBILITY CURRENT STATUS: HCPCS

## 2018-11-27 PROCEDURE — 6370000000 HC RX 637 (ALT 250 FOR IP): Performed by: INTERNAL MEDICINE

## 2018-11-27 PROCEDURE — 6360000004 HC RX CONTRAST MEDICATION: Performed by: EMERGENCY MEDICINE

## 2018-11-27 PROCEDURE — 99222 1ST HOSP IP/OBS MODERATE 55: CPT | Performed by: INTERNAL MEDICINE

## 2018-11-27 PROCEDURE — 85730 THROMBOPLASTIN TIME PARTIAL: CPT

## 2018-11-27 RX ORDER — NICOTINE 21 MG/24HR
1 PATCH, TRANSDERMAL 24 HOURS TRANSDERMAL DAILY PRN
Status: DISCONTINUED | OUTPATIENT
Start: 2018-11-27 | End: 2018-11-28 | Stop reason: HOSPADM

## 2018-11-27 RX ORDER — SODIUM CHLORIDE 0.9 % (FLUSH) 0.9 %
10 SYRINGE (ML) INJECTION PRN
Status: DISCONTINUED | OUTPATIENT
Start: 2018-11-27 | End: 2018-11-28 | Stop reason: HOSPADM

## 2018-11-27 RX ORDER — ALBUTEROL SULFATE 2.5 MG/3ML
2.5 SOLUTION RESPIRATORY (INHALATION)
Status: DISCONTINUED | OUTPATIENT
Start: 2018-11-27 | End: 2018-11-27

## 2018-11-27 RX ORDER — DULOXETIN HYDROCHLORIDE 30 MG/1
60 CAPSULE, DELAYED RELEASE ORAL NIGHTLY
Status: DISCONTINUED | OUTPATIENT
Start: 2018-11-27 | End: 2018-11-28 | Stop reason: HOSPADM

## 2018-11-27 RX ORDER — OXYBUTYNIN CHLORIDE 5 MG/1
15 TABLET, EXTENDED RELEASE ORAL DAILY
Status: DISCONTINUED | OUTPATIENT
Start: 2018-11-27 | End: 2018-11-28 | Stop reason: HOSPADM

## 2018-11-27 RX ORDER — LISINOPRIL 10 MG/1
10 TABLET ORAL DAILY
Status: DISCONTINUED | OUTPATIENT
Start: 2018-11-28 | End: 2018-11-28

## 2018-11-27 RX ORDER — ACETAMINOPHEN 325 MG/1
650 TABLET ORAL EVERY 4 HOURS PRN
Status: DISCONTINUED | OUTPATIENT
Start: 2018-11-27 | End: 2018-11-28 | Stop reason: HOSPADM

## 2018-11-27 RX ORDER — SODIUM CHLORIDE 9 MG/ML
INJECTION, SOLUTION INTRAVENOUS CONTINUOUS
Status: DISCONTINUED | OUTPATIENT
Start: 2018-11-27 | End: 2018-11-28 | Stop reason: HOSPADM

## 2018-11-27 RX ORDER — SODIUM CHLORIDE 0.9 % (FLUSH) 0.9 %
10 SYRINGE (ML) INJECTION EVERY 12 HOURS SCHEDULED
Status: DISCONTINUED | OUTPATIENT
Start: 2018-11-27 | End: 2018-11-28 | Stop reason: HOSPADM

## 2018-11-27 RX ORDER — ALBUTEROL SULFATE 2.5 MG/3ML
2.5 SOLUTION RESPIRATORY (INHALATION) EVERY 6 HOURS PRN
Status: DISCONTINUED | OUTPATIENT
Start: 2018-11-27 | End: 2018-11-28 | Stop reason: HOSPADM

## 2018-11-27 RX ORDER — ARIPIPRAZOLE 10 MG/1
10 TABLET ORAL DAILY
Status: DISCONTINUED | OUTPATIENT
Start: 2018-11-27 | End: 2018-11-28 | Stop reason: HOSPADM

## 2018-11-27 RX ORDER — ONDANSETRON 2 MG/ML
4 INJECTION INTRAMUSCULAR; INTRAVENOUS EVERY 6 HOURS PRN
Status: DISCONTINUED | OUTPATIENT
Start: 2018-11-27 | End: 2018-11-28 | Stop reason: HOSPADM

## 2018-11-27 RX ORDER — HYDROCODONE BITARTRATE AND ACETAMINOPHEN 5; 325 MG/1; MG/1
1 TABLET ORAL EVERY 6 HOURS PRN
Status: DISCONTINUED | OUTPATIENT
Start: 2018-11-27 | End: 2018-11-28 | Stop reason: HOSPADM

## 2018-11-27 RX ORDER — DULOXETIN HYDROCHLORIDE 30 MG/1
30 CAPSULE, DELAYED RELEASE ORAL NIGHTLY
Status: DISCONTINUED | OUTPATIENT
Start: 2018-11-27 | End: 2018-11-27

## 2018-11-27 RX ORDER — METOPROLOL TARTRATE 50 MG/1
50 TABLET, FILM COATED ORAL DAILY
COMMUNITY

## 2018-11-27 RX ORDER — ONDANSETRON 2 MG/ML
4 INJECTION INTRAMUSCULAR; INTRAVENOUS ONCE
Status: COMPLETED | OUTPATIENT
Start: 2018-11-27 | End: 2018-11-27

## 2018-11-27 RX ORDER — ROPINIROLE 0.25 MG/1
0.5 TABLET, FILM COATED ORAL NIGHTLY
Status: DISCONTINUED | OUTPATIENT
Start: 2018-11-27 | End: 2018-11-27

## 2018-11-27 RX ORDER — PREGABALIN 50 MG/1
50 CAPSULE ORAL 2 TIMES DAILY
Status: DISCONTINUED | OUTPATIENT
Start: 2018-11-27 | End: 2018-11-28 | Stop reason: HOSPADM

## 2018-11-27 RX ORDER — 0.9 % SODIUM CHLORIDE 0.9 %
30 INTRAVENOUS SOLUTION INTRAVENOUS ONCE
Status: COMPLETED | OUTPATIENT
Start: 2018-11-27 | End: 2018-11-27

## 2018-11-27 RX ORDER — MAGNESIUM SULFATE 1 G/100ML
1 INJECTION INTRAVENOUS PRN
Status: DISCONTINUED | OUTPATIENT
Start: 2018-11-27 | End: 2018-11-28 | Stop reason: HOSPADM

## 2018-11-27 RX ORDER — PANTOPRAZOLE SODIUM 40 MG/1
40 TABLET, DELAYED RELEASE ORAL 2 TIMES DAILY
Status: DISCONTINUED | OUTPATIENT
Start: 2018-11-27 | End: 2018-11-28 | Stop reason: HOSPADM

## 2018-11-27 RX ORDER — POTASSIUM CHLORIDE 20 MEQ/1
40 TABLET, EXTENDED RELEASE ORAL PRN
Status: DISCONTINUED | OUTPATIENT
Start: 2018-11-27 | End: 2018-11-28 | Stop reason: HOSPADM

## 2018-11-27 RX ORDER — CLOPIDOGREL BISULFATE 75 MG/1
75 TABLET ORAL DAILY
Status: DISCONTINUED | OUTPATIENT
Start: 2018-11-27 | End: 2018-11-28 | Stop reason: HOSPADM

## 2018-11-27 RX ORDER — ROPINIROLE 1 MG/1
2 TABLET, FILM COATED ORAL NIGHTLY
Status: DISCONTINUED | OUTPATIENT
Start: 2018-11-27 | End: 2018-11-28 | Stop reason: HOSPADM

## 2018-11-27 RX ORDER — POTASSIUM CHLORIDE 20MEQ/15ML
40 LIQUID (ML) ORAL PRN
Status: DISCONTINUED | OUTPATIENT
Start: 2018-11-27 | End: 2018-11-28 | Stop reason: HOSPADM

## 2018-11-27 RX ORDER — TOPIRAMATE 50 MG/1
150 TABLET, FILM COATED ORAL DAILY
Status: DISCONTINUED | OUTPATIENT
Start: 2018-11-27 | End: 2018-11-28 | Stop reason: HOSPADM

## 2018-11-27 RX ORDER — HYDROXYZINE HYDROCHLORIDE 25 MG/1
50 TABLET, FILM COATED ORAL 2 TIMES DAILY
Status: DISCONTINUED | OUTPATIENT
Start: 2018-11-27 | End: 2018-11-28 | Stop reason: HOSPADM

## 2018-11-27 RX ORDER — FLUTICASONE PROPIONATE 220 UG/1
1 AEROSOL, METERED RESPIRATORY (INHALATION) 2 TIMES DAILY
Status: DISCONTINUED | OUTPATIENT
Start: 2018-11-27 | End: 2018-11-28 | Stop reason: HOSPADM

## 2018-11-27 RX ORDER — HYDROXYZINE PAMOATE 50 MG/1
50 CAPSULE ORAL 3 TIMES DAILY PRN
COMMUNITY

## 2018-11-27 RX ORDER — POTASSIUM CHLORIDE 7.45 MG/ML
10 INJECTION INTRAVENOUS PRN
Status: DISCONTINUED | OUTPATIENT
Start: 2018-11-27 | End: 2018-11-28 | Stop reason: HOSPADM

## 2018-11-27 RX ORDER — DULOXETIN HYDROCHLORIDE 30 MG/1
30 CAPSULE, DELAYED RELEASE ORAL NIGHTLY
Status: ON HOLD | COMMUNITY
End: 2018-11-28 | Stop reason: HOSPADM

## 2018-11-27 RX ORDER — QUETIAPINE FUMARATE 100 MG/1
100 TABLET, FILM COATED ORAL NIGHTLY
Status: DISCONTINUED | OUTPATIENT
Start: 2018-11-27 | End: 2018-11-28 | Stop reason: HOSPADM

## 2018-11-27 RX ORDER — CLOPIDOGREL BISULFATE 75 MG/1
75 TABLET ORAL DAILY
COMMUNITY
End: 2021-06-08

## 2018-11-27 RX ORDER — ROPINIROLE 3 MG/1
3 TABLET, FILM COATED ORAL NIGHTLY
COMMUNITY

## 2018-11-27 RX ORDER — PREGABALIN 50 MG/1
50 CAPSULE ORAL 2 TIMES DAILY
COMMUNITY
End: 2021-12-13

## 2018-11-27 RX ORDER — KETOROLAC TROMETHAMINE 15 MG/ML
15 INJECTION, SOLUTION INTRAMUSCULAR; INTRAVENOUS EVERY 6 HOURS PRN
Status: DISCONTINUED | OUTPATIENT
Start: 2018-11-27 | End: 2018-11-28 | Stop reason: HOSPADM

## 2018-11-27 RX ORDER — BISACODYL 10 MG
10 SUPPOSITORY, RECTAL RECTAL DAILY PRN
Status: DISCONTINUED | OUTPATIENT
Start: 2018-11-27 | End: 2018-11-28 | Stop reason: HOSPADM

## 2018-11-27 RX ORDER — CETIRIZINE HYDROCHLORIDE 10 MG/1
10 TABLET ORAL DAILY
Status: DISCONTINUED | OUTPATIENT
Start: 2018-11-27 | End: 2018-11-28 | Stop reason: HOSPADM

## 2018-11-27 RX ORDER — FENTANYL CITRATE 50 UG/ML
25 INJECTION, SOLUTION INTRAMUSCULAR; INTRAVENOUS ONCE
Status: COMPLETED | OUTPATIENT
Start: 2018-11-27 | End: 2018-11-27

## 2018-11-27 RX ORDER — HYDROCODONE BITARTRATE AND ACETAMINOPHEN 5; 325 MG/1; MG/1
1 TABLET ORAL EVERY 12 HOURS PRN
COMMUNITY
End: 2021-12-13

## 2018-11-27 RX ADMIN — DULOXETINE HYDROCHLORIDE 60 MG: 30 CAPSULE, DELAYED RELEASE ORAL at 21:26

## 2018-11-27 RX ADMIN — QUETIAPINE FUMARATE 100 MG: 100 TABLET ORAL at 21:26

## 2018-11-27 RX ADMIN — CLOPIDOGREL 75 MG: 75 TABLET, FILM COATED ORAL at 21:09

## 2018-11-27 RX ADMIN — PREGABALIN 50 MG: 50 CAPSULE ORAL at 21:09

## 2018-11-27 RX ADMIN — SODIUM CHLORIDE: 9 INJECTION, SOLUTION INTRAVENOUS at 04:54

## 2018-11-27 RX ADMIN — SODIUM CHLORIDE 2694 ML: 9 INJECTION, SOLUTION INTRAVENOUS at 02:02

## 2018-11-27 RX ADMIN — IOPAMIDOL 75 ML: 755 INJECTION, SOLUTION INTRAVENOUS at 05:15

## 2018-11-27 RX ADMIN — ROPINIROLE HYDROCHLORIDE 2 MG: 1 TABLET, FILM COATED ORAL at 21:09

## 2018-11-27 RX ADMIN — ONDANSETRON 4 MG: 2 INJECTION INTRAMUSCULAR; INTRAVENOUS at 03:01

## 2018-11-27 RX ADMIN — FENTANYL CITRATE 25 MCG: 50 INJECTION, SOLUTION INTRAMUSCULAR; INTRAVENOUS at 03:40

## 2018-11-27 RX ADMIN — METOPROLOL TARTRATE 25 MG: 25 TABLET ORAL at 21:37

## 2018-11-27 RX ADMIN — POTASSIUM CHLORIDE 40 MEQ: 20 TABLET, EXTENDED RELEASE ORAL at 05:48

## 2018-11-27 RX ADMIN — KETOROLAC TROMETHAMINE 15 MG: 15 INJECTION, SOLUTION INTRAMUSCULAR; INTRAVENOUS at 16:30

## 2018-11-27 RX ADMIN — PANTOPRAZOLE SODIUM 40 MG: 40 TABLET, DELAYED RELEASE ORAL at 21:09

## 2018-11-27 RX ADMIN — HYDROXYZINE HYDROCHLORIDE 50 MG: 25 TABLET ORAL at 21:09

## 2018-11-27 ASSESSMENT — ENCOUNTER SYMPTOMS
ABDOMINAL PAIN: 0
COUGH: 0
NAUSEA: 0
EYE DISCHARGE: 0
SORE THROAT: 0
RHINORRHEA: 0
BLOOD IN STOOL: 0
VOMITING: 0
SHORTNESS OF BREATH: 0
CHEST TIGHTNESS: 0
EYE REDNESS: 0
DIARRHEA: 0

## 2018-11-27 ASSESSMENT — PAIN SCALES - GENERAL
PAINLEVEL_OUTOF10: 6
PAINLEVEL_OUTOF10: 6
PAINLEVEL_OUTOF10: 5
PAINLEVEL_OUTOF10: 6
PAINLEVEL_OUTOF10: 2

## 2018-11-27 ASSESSMENT — PAIN DESCRIPTION - PAIN TYPE
TYPE: ACUTE PAIN

## 2018-11-27 ASSESSMENT — PAIN DESCRIPTION - LOCATION
LOCATION: NECK

## 2018-11-27 ASSESSMENT — PAIN DESCRIPTION - FREQUENCY
FREQUENCY: CONTINUOUS

## 2018-11-27 ASSESSMENT — PAIN DESCRIPTION - DESCRIPTORS
DESCRIPTORS: ACHING;SHOOTING;DISCOMFORT
DESCRIPTORS: ACHING
DESCRIPTORS: ACHING;SORE

## 2018-11-27 ASSESSMENT — PAIN DESCRIPTION - ONSET
ONSET: ON-GOING

## 2018-11-27 ASSESSMENT — PAIN DESCRIPTION - ORIENTATION
ORIENTATION: RIGHT
ORIENTATION: RIGHT

## 2018-11-27 ASSESSMENT — PAIN DESCRIPTION - PROGRESSION
CLINICAL_PROGRESSION: NOT CHANGED

## 2018-11-27 NOTE — ED PROVIDER NOTES
[address] [facility]  ED  Emergency Department Encounter  EmergencyMedicine Resident     Pt Name:Digna Everett  MRN: [id number]  [place] 1960  Date of evaluation: 11/27/18  PCP:  JOSEMANUEL Silver CNP    CHIEF COMPLAINT       Chief Complaint   Patient presents with    Dizziness       HISTORY OF PRESENT ILLNESS  (Location/Symptom, Timing/Onset, Context/Setting, Quality, Duration, Modifying Factors, Severity.)      Susan Sharma is a 56 y.o. female who presents with Complaints of fall/syncope while at home after getting up from a seated position due to feeling really weak and dizzy. Patient is complaining of neck pain as well as continued dizziness. Patient has a significant past medical history for COPD, hypertension, anxiety and depression, CAD, and asthma. Patient denies any change in her vision, denies any headache, no chest pain or shortness of breath, denies any abdominal pain or any  symptoms. PAST MEDICAL / SURGICAL / SOCIAL / FAMILY HISTORY      has a past medical history of Anxiety and depression; Asthma; Back injury; Cataracts, bilateral; COPD (chronic obstructive pulmonary disease) ([address] Gastroesophageal reflux disease without esophagitis; H/O blood clots; Headache(784.0); Heart attack ([address] Hypertension; Knee injury; Osteoarthritis; Psoriasis; and PTSD (post-traumatic stress disorder). has a past surgical history that includes knee surgery; Elbow surgery; Cervix surgery (6603/0373); LEEP; and Nerve Block (2018).     Social History     Social History    Marital status: Single     Spouse name: N/A    Number of children: N/A    Years of education: N/A     Occupational History          Social History Main Topics    Smoking status: Current Every Day Smoker     Packs/day: 0.25     Years: 45.00     Types: Cigarettes    Smokeless tobacco: Never Used      Comment: uses the e-vape    Alcohol use No    Drug use: No    Sexual activity: Not on file     Other Topics Concern    Not on file     Social History Narrative    Raising 3 of her 9 grandchildren       Family History   Problem Relation Age of Onset    Heart Disease Mother     High Blood Pressure Mother     Heart Disease Maternal Grandfather     Stroke Maternal Grandfather        Allergies:  Aspartame and phenylalanine; Benadryl [diphenhydramine]; Celebrex [celecoxib]; Demerol hcl [meperidine]; Gabapentin; and Morphine    Home Medications:  Prior to Admission medications    Medication Sig Start Date End Date Taking? Authorizing Provider   topiramate (TOPAMAX) 50 MG tablet Take 3 tablets by mouth daily 10/3/18   JOSEMANUEL Flores CNP   SUMAtriptan Succinate 6 MG/0.5ML SOAJ Take at onset on migraine and may repeat once in 2 hours. . No more than 2 doses per day and 4 per week 10/3/18   JOSEMANUEL Flores CNP   rizatriptan (MAXALT-MLT) 5 MG disintegrating tablet DISSOLVE 1 TABLET ON THE TONGUE 1 TIME AS NEEDED FOR MIGRAINE. MAY.  REPEAT IN 2 HOURS AS NEEDED 7/11/18   JOSEMANUEL Flores CNP   albuterol (PROVENTIL) (2.5 MG/3ML) 0.083% nebulizer solution Take 3 mLs by nebulization every 6 hours as needed for Wheezing 9/9/17   Roberto Carlos Gore,    Magnesium 400 MG CAPS Take 400 mg by mouth daily 5/30/17 10/3/18  Curtis Bose MD   butalbital-acetaminophen-caffeine (FIORICET) -22 MG per tablet Take 1 tablet by mouth every 6 hours as needed for Headaches 4/26/17   Curtis Bose MD   clonazePAM (KLONOPIN) 0.5 MG tablet Take 0.5 mg by mouth 3 times daily as needed    Historical Provider, MD   ARIPiprazole (ABILIFY) 5 MG tablet Take 10 mg by mouth daily     Historical Provider, MD   QUEtiapine (SEROQUEL) 100 MG tablet Take 1 tablet by mouth nightly 9/28/16   Historical Provider, MD   fexofenadine (ALLEGRA) 180 MG tablet Take 1 tablet by mouth daily 8/8/16   Historical Provider, MD   ibuprofen (ADVIL;MOTRIN) 600 MG tablet Take 1 tablet by mouth 3 times daily 9/10/16   Historical Provider, MD Musculoskeletal: Normal range of motion. She exhibits no edema. Neurological: She is alert and oriented to person, place, and time. No cranial nerve deficit. Alert and oriented, answering questions appropriately, no focal neurological deficits noted, moving all extremities with normal motor and sensory strength   Skin: Capillary refill takes 2 to 3 seconds. No rash noted. She is diaphoretic. There is pallor. Psychiatric: She has a normal mood and affect.        DIFFERENTIAL  DIAGNOSIS     PLAN (LABS / IMAGING / EKG):  Orders Placed This Encounter   Procedures    Culture Blood #1    Culture Blood #1    Urine Culture    XR CHEST PORTABLE    CT HEAD WO CONTRAST    CT CERVICAL SPINE WO CONTRAST    CTA ABDOMEN PELVIS W CONTRAST    CTA CHEST W WO CONTRAST    CBC Auto Differential    Comprehensive Metabolic Panel    Lactic Acid, Whole Blood    Magnesium    Protime-INR    APTT    Urinalysis with Microscopic    Basic Metabolic Panel w/ Reflex to MG    Magnesium    Troponin    Brain natriuretic peptide    CBC    Brain Natriuretic Peptide    DIET CARDIAC;    Vital signs per unit routine    Telemetry monitoring    Orthostatic blood pressure and pulse    Notify physician    Up with assistance    Neuro checks    Tobacco cessation education protocol    Place intermittent pneumatic compression device    Daily weights    Intake and output    Full Code    Inpatient consult to Hospitalist    Inpatient consult to Social Work    OT eval and treat    PT evaluation and treat    Initiate Oxygen Therapy Protocol    Initiate RT Protocol    Respiratory care evaluation only    HHN Treatment    Pulse oximetry, continuous    POCT troponin    POCT troponin    POCT troponin    EKG 12 Lead    EKG 12 lead    Echocardiogram complete 2D with doppler with color    PATIENT STATUS (FROM ED OR OR/PROCEDURAL) Inpatient       MEDICATIONS ORDERED:  Orders Placed This Encounter   Medications    0.9 % k/uL    MPV 11.6 8.1 - 13.5 fL    NRBC Automated 0.0 0.0 per 100 WBC    Differential Type NOT REPORTED     Seg Neutrophils 47 36 - 65 %    Lymphocytes 43 24 - 43 %    Monocytes 6 3 - 12 %    Eosinophils % 3 1 - 4 %    Basophils 1 0 - 2 %    Immature Granulocytes 0 0 %    Segs Absolute 4.58 1.50 - 8.10 k/uL    Absolute Lymph # 4.08 (H) 1.10 - 3.70 k/uL    Absolute Mono # 0.59 0.10 - 1.20 k/uL    Absolute Eos # 0.25 0.00 - 0.44 k/uL    Basophils # 0.07 0.00 - 0.20 k/uL    Absolute Immature Granulocyte 0.03 0.00 - 0.30 k/uL    WBC Morphology NOT REPORTED     RBC Morphology NOT REPORTED     Platelet Estimate NOT REPORTED    Comprehensive Metabolic Panel   Result Value Ref Range    Glucose 122 (H) 70 - 99 mg/dL    BUN 11 6 - 20 mg/dL    CREATININE 0.83 0.50 - 0.90 mg/dL    Bun/Cre Ratio NOT REPORTED 9 - 20    Calcium 7.9 (L) 8.6 - 10.4 mg/dL    Sodium 137 135 - 144 mmol/L    Potassium 3.5 (L) 3.7 - 5.3 mmol/L    Chloride 108 (H) 98 - 107 mmol/L    CO2 20 20 - 31 mmol/L    Anion Gap 9 9 - 17 mmol/L    Alkaline Phosphatase 64 35 - 104 U/L    ALT 9 5 - 33 U/L    AST 17 <32 U/L    Total Bilirubin <0.10 (L) 0.3 - 1.2 mg/dL    Total Protein 5.5 (L) 6.4 - 8.3 g/dL    Alb 3.2 (L) 3.5 - 5.2 g/dL    Albumin/Globulin Ratio 1.4 1.0 - 2.5    GFR Non-African American >60 >60 mL/min    GFR African American >60 >60 mL/min    GFR Comment          GFR Staging NOT REPORTED    Lactic Acid, Whole Blood   Result Value Ref Range    Lactic Acid, Whole Blood 1.3 0.7 - 2.1 mmol/L   Lactic Acid, Whole Blood   Result Value Ref Range    Lactic Acid, Whole Blood 0.7 0.7 - 2.1 mmol/L   Magnesium   Result Value Ref Range    Magnesium 1.9 1.6 - 2.6 mg/dL   Protime-INR   Result Value Ref Range    Protime 11.1 9.0 - 12.0 sec    INR 1.0    APTT   Result Value Ref Range    PTT 23.8 20.5 - 30.5 sec   Urinalysis with Microscopic   Result Value Ref Range    Color, UA YELLOW YEL    Turbidity UA CLEAR CLEAR    Glucose, Ur NEGATIVE NEG    Bilirubin Urine NEGATIVE

## 2018-11-27 NOTE — ED PROVIDER NOTES
following:        Result Value    RBC 3.55 (*)     Hemoglobin 10.2 (*)     Hematocrit 31.1 (*)     Absolute Lymph # 4.08 (*)     All other components within normal limits   COMPREHENSIVE METABOLIC PANEL - Abnormal; Notable for the following:     Glucose 122 (*)     Calcium 7.9 (*)     Potassium 3.5 (*)     Chloride 108 (*)     Total Bilirubin <0.10 (*)     Total Protein 5.5 (*)     Alb 3.2 (*)     All other components within normal limits   BRAIN NATRIURETIC PEPTIDE - Abnormal; Notable for the following:     Pro- (*)     All other components within normal limits   CULTURE BLOOD #1   CULTURE BLOOD #1   URINE CULTURE   LACTIC ACID, WHOLE BLOOD   LACTIC ACID, WHOLE BLOOD   MAGNESIUM   PROTIME-INR   APTT   URINALYSIS WITH MICROSCOPIC   TROPONIN   TROPONIN   BRAIN NATRIURETIC PEPTIDE   POCT TROPONIN   POCT TROPONIN   POCT TROPONIN   POCT TROPONIN       Ct Head Wo Contrast    Result Date: 11/27/2018  EXAMINATION: CT OF THE HEAD WITHOUT CONTRAST; CT OF THE CERVICAL SPINE WITHOUT CONTRAST 11/27/2018 2:37 am TECHNIQUE: CT of the head was performed without the administration of intravenous contrast. Dose modulation, iterative reconstruction, and/or weight based adjustment of the mA/kV was utilized to reduce the radiation dose to as low as reasonably achievable.; CT of the cervical spine was performed without the administration of intravenous contrast. Multiplanar reformatted images are provided for review. Dose modulation, iterative reconstruction, and/or weight based adjustment of the mA/kV was utilized to reduce the radiation dose to as low as reasonably achievable. COMPARISON: None. HISTORY: ORDERING SYSTEM PROVIDED HISTORY: fall TECHNOLOGIST PROVIDED HISTORY: FINDINGS: CT head: BRAIN/VENTRICLES: There is no acute intracranial hemorrhage, mass effect or midline shift. No abnormal extra-axial fluid collection. The gray-white differentiation is maintained without evidence of an acute infarct.   There is no evidence

## 2018-11-27 NOTE — H&P
[facility]    HISTORY AND PHYSICAL EXAMINATION            Date:   11/27/2018  Patient name:  Nelida Mcdonough  Date of admission:  11/27/2018 12:54 AM  MRN:   [id number]  Account:  [de-identified]  YOB: 1960  PCP:    JOSEMANUEL Beaulieu CNP  Room:   25/25  Code Status:    Full Code    Chief Complaint:     Chief Complaint   Patient presents with    Dizziness     History Obtained From:     patient    History of Present Illness:     Miss [employer] is a nice 56year old lady with history of obesity, tobacco abuse, migraine disorder, anxiety and depression. Recently had cardiac cath with stent placement. Also placed on lisinopril as her BP was high. Admitted from home last night. Admitted with sudden onset dizziness, noted when she woke up to go to rest room middle of night. No other sickness before she went for sleep such as nausea, vomiting, diarrhea. 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    Past Medical History:     Past Medical History:   Diagnosis Date    Anxiety and depression     Asthma     Back injury     Cataracts, bilateral     COPD (chronic obstructive pulmonary disease) ([facility] [address] 8/13/2015    Gastroesophageal reflux disease without esophagitis 8/13/2015    H/O blood clots     Headache(784.0)     Heart attack ([facility] [address]     Hypertension     Knee injury     Osteoarthritis     Psoriasis     PTSD (post-traumatic stress disorder)       Past Surgical History:     Past Surgical History:   Procedure Laterality Date    CERVIX SURGERY  1997/1998    ELBOW SURGERY      KNEE SURGERY      LEEP      NERVE BLOCK  2018      Medications Prior to Admission:     Prior to Admission medications    Medication Sig Start Date End Date Taking?  Authorizing Provider   metoprolol tartrate (LOPRESSOR)

## 2018-11-27 NOTE — ED PROVIDER NOTES
[facility]     Emergency Department     Faculty Attestation    I performed a history and physical examination of the patient and discussed management with the resident. I reviewed the residents note and agree with the documented findings and plan of care. Any areas of disagreement are noted on the chart. I was personally present for the key portions of any procedures. I have documented in the chart those procedures where I was not present during the key portions. I have reviewed the emergency nurses triage note. I agree with the chief complaint, past medical history, past surgical history, allergies, medications, social and family history as documented unless otherwise noted below. For Physician Assistant/ Nurse Practitioner cases/documentation I have personally evaluated this patient and have completed at least one if not all key elements of the E/M (history, physical exam, and MDM). Additional findings are as noted. Awake and alert, patient appears anemic, conjunctival pallor, equal breath sounds, heart tones normal, no lower extremity pain or swelling on examination. Blood pressure 73/39    CRITICAL CARE: There was a high probability of clinically significant/life threatening deterioration in this patient's condition which required my urgent intervention. Total critical care time was 30 minutes. This excludes any time for separately reportable procedures. Tamiko Nava MD  11/27/18 0129    Abdomen nontender without pulsatile mass or bruits       Tamiko Nava MD  11/27/18 0131    Blood pressure improved, patient's abdomen remains benign. No guarding or rebounding, no pain over the spleen or liver.        Tamiko Nava MD  11/27/18 0400    EKG Interpretation    Interpreted by me    Rhythm: normal sinus   Rate: 50  Axis: normal  Ectopy: none  Conduction: normal  ST Segments: no acute change  T Waves: no acute change  Q Waves: none    Clinical Impression: no acute changes and

## 2018-11-28 VITALS
RESPIRATION RATE: 16 BRPM | TEMPERATURE: 98.4 F | OXYGEN SATURATION: 96 % | DIASTOLIC BLOOD PRESSURE: 74 MMHG | HEIGHT: 63 IN | HEART RATE: 72 BPM | SYSTOLIC BLOOD PRESSURE: 159 MMHG | WEIGHT: 199.96 LBS | BODY MASS INDEX: 35.43 KG/M2

## 2018-11-28 PROBLEM — I95.1 ORTHOSTATIC HYPOTENSION: Status: RESOLVED | Noted: 2018-11-27 | Resolved: 2018-11-28

## 2018-11-28 LAB
ANION GAP SERPL CALCULATED.3IONS-SCNC: 9 MMOL/L (ref 9–17)
BUN BLDV-MCNC: 8 MG/DL (ref 6–20)
BUN/CREAT BLD: ABNORMAL (ref 9–20)
CALCIUM SERPL-MCNC: 8.6 MG/DL (ref 8.6–10.4)
CHLORIDE BLD-SCNC: 113 MMOL/L (ref 98–107)
CO2: 21 MMOL/L (ref 20–31)
CREAT SERPL-MCNC: 0.84 MG/DL (ref 0.5–0.9)
GFR AFRICAN AMERICAN: >60 ML/MIN
GFR NON-AFRICAN AMERICAN: >60 ML/MIN
GFR SERPL CREATININE-BSD FRML MDRD: ABNORMAL ML/MIN/{1.73_M2}
GFR SERPL CREATININE-BSD FRML MDRD: ABNORMAL ML/MIN/{1.73_M2}
GLUCOSE BLD-MCNC: 100 MG/DL (ref 70–99)
HCT VFR BLD CALC: 35.2 % (ref 36.3–47.1)
HEMOGLOBIN: 11.2 G/DL (ref 11.9–15.1)
LV EF: 55 %
LVEF MODALITY: NORMAL
MAGNESIUM: 2 MG/DL (ref 1.6–2.6)
MCH RBC QN AUTO: 28.1 PG (ref 25.2–33.5)
MCHC RBC AUTO-ENTMCNC: 31.8 G/DL (ref 28.4–34.8)
MCV RBC AUTO: 88.2 FL (ref 82.6–102.9)
NRBC AUTOMATED: 0 PER 100 WBC
PDW BLD-RTO: 13.4 % (ref 11.8–14.4)
PLATELET # BLD: 220 K/UL (ref 138–453)
PMV BLD AUTO: 10.9 FL (ref 8.1–13.5)
POTASSIUM SERPL-SCNC: 4.1 MMOL/L (ref 3.7–5.3)
RBC # BLD: 3.99 M/UL (ref 3.95–5.11)
SODIUM BLD-SCNC: 143 MMOL/L (ref 135–144)
WBC # BLD: 7.2 K/UL (ref 3.5–11.3)

## 2018-11-28 PROCEDURE — G8989 SELF CARE D/C STATUS: HCPCS

## 2018-11-28 PROCEDURE — 83735 ASSAY OF MAGNESIUM: CPT

## 2018-11-28 PROCEDURE — 6360000002 HC RX W HCPCS: Performed by: INTERNAL MEDICINE

## 2018-11-28 PROCEDURE — 97535 SELF CARE MNGMENT TRAINING: CPT

## 2018-11-28 PROCEDURE — G8988 SELF CARE GOAL STATUS: HCPCS

## 2018-11-28 PROCEDURE — 85027 COMPLETE CBC AUTOMATED: CPT

## 2018-11-28 PROCEDURE — 93306 TTE W/DOPPLER COMPLETE: CPT

## 2018-11-28 PROCEDURE — 6370000000 HC RX 637 (ALT 250 FOR IP): Performed by: NURSE PRACTITIONER

## 2018-11-28 PROCEDURE — 99232 SBSQ HOSP IP/OBS MODERATE 35: CPT | Performed by: INTERNAL MEDICINE

## 2018-11-28 PROCEDURE — 97165 OT EVAL LOW COMPLEX 30 MIN: CPT

## 2018-11-28 PROCEDURE — 6360000002 HC RX W HCPCS: Performed by: NURSE PRACTITIONER

## 2018-11-28 PROCEDURE — 6370000000 HC RX 637 (ALT 250 FOR IP): Performed by: INTERNAL MEDICINE

## 2018-11-28 PROCEDURE — 80048 BASIC METABOLIC PNL TOTAL CA: CPT

## 2018-11-28 PROCEDURE — G0008 ADMIN INFLUENZA VIRUS VAC: HCPCS | Performed by: INTERNAL MEDICINE

## 2018-11-28 PROCEDURE — 90686 IIV4 VACC NO PRSV 0.5 ML IM: CPT | Performed by: INTERNAL MEDICINE

## 2018-11-28 PROCEDURE — G8987 SELF CARE CURRENT STATUS: HCPCS

## 2018-11-28 PROCEDURE — 36415 COLL VENOUS BLD VENIPUNCTURE: CPT

## 2018-11-28 RX ADMIN — KETOROLAC TROMETHAMINE 15 MG: 15 INJECTION, SOLUTION INTRAMUSCULAR; INTRAVENOUS at 11:09

## 2018-11-28 RX ADMIN — CETIRIZINE HYDROCHLORIDE 10 MG: 10 TABLET ORAL at 09:49

## 2018-11-28 RX ADMIN — MAGNESIUM GLUCONATE 500 MG ORAL TABLET 400 MG: 500 TABLET ORAL at 09:49

## 2018-11-28 RX ADMIN — ENOXAPARIN SODIUM 40 MG: 40 INJECTION SUBCUTANEOUS at 09:48

## 2018-11-28 RX ADMIN — PREGABALIN 50 MG: 50 CAPSULE ORAL at 09:48

## 2018-11-28 RX ADMIN — HYDROXYZINE HYDROCHLORIDE 50 MG: 25 TABLET ORAL at 09:49

## 2018-11-28 RX ADMIN — METOPROLOL TARTRATE 25 MG: 25 TABLET ORAL at 09:48

## 2018-11-28 RX ADMIN — KETOROLAC TROMETHAMINE 15 MG: 15 INJECTION, SOLUTION INTRAMUSCULAR; INTRAVENOUS at 03:36

## 2018-11-28 RX ADMIN — INFLUENZA A VIRUS A/MICHIGAN/45/2015 X-275 (H1N1) ANTIGEN (FORMALDEHYDE INACTIVATED), INFLUENZA A VIRUS A/SINGAPORE/INFIMH-16-0019/2016 IVR-186 (H3N2) ANTIGEN (FORMALDEHYDE INACTIVATED), INFLUENZA B VIRUS B/PHUKET/3073/2013 ANTIGEN (FORMALDEHYDE INACTIVATED), AND INFLUENZA B VIRUS B/MARYLAND/15/2016 BX-69A ANTIGEN (FORMALDEHYDE INACTIVATED) 0.5 ML: 15; 15; 15; 15 INJECTION, SUSPENSION INTRAMUSCULAR at 11:07

## 2018-11-28 RX ADMIN — PANTOPRAZOLE SODIUM 40 MG: 40 TABLET, DELAYED RELEASE ORAL at 09:48

## 2018-11-28 ASSESSMENT — PAIN DESCRIPTION - LOCATION
LOCATION: NECK

## 2018-11-28 ASSESSMENT — PAIN SCALES - GENERAL
PAINLEVEL_OUTOF10: 6
PAINLEVEL_OUTOF10: 4

## 2018-11-28 ASSESSMENT — PAIN DESCRIPTION - PAIN TYPE
TYPE: CHRONIC PAIN
TYPE: ACUTE PAIN

## 2018-11-28 NOTE — CARE COORDINATION
Discharge [address] [facility]  Clinical Case Management Department  Written by: Asif Summers RN    Patient Name: Olesya Arce  Attending Provider: Gabo Romero MD  Admit Date: 2018 12:54 AM  MRN: [id number]  Account: [de-identified]                     : 1960  Discharge Date: 2018      Disposition:  Home and has follow up appt with Neurology 2018  Medical cab arranged thru [employer].  SBI#[id number]    Asif Summers RN

## 2018-11-28 NOTE — PROGRESS NOTES
Pt discharged home per self in medical transport with all documented belongings and discharge paperwork.
Smoking Cessation - topics covered   []  Health Risks  []  Benefits of Quitting   []  Smoking Cessation  []  Patient has no history of tobacco use  []  Patient is former smoker. []  No need for tobacco cessation education. []  Booklet given  [x]  Patient verbalizes understanding. [x]  Patient denies need for tobacco cessation education.   Tiago Cheek  1:47 PM
Daily    clopidogrel  75 mg Oral Daily    hydrOXYzine  50 mg Oral BID    pregabalin  50 mg Oral BID    rOPINIRole  2 mg Oral Nightly    metoprolol tartrate  25 mg Oral BID     Continuous Infusions:    sodium chloride 75 mL/hr at 18 0454     PRN Meds: albuterol, sodium chloride flush, potassium chloride **OR** potassium chloride **OR** potassium chloride, magnesium sulfate, magnesium hydroxide, bisacodyl, ondansetron, nicotine, acetaminophen, ketorolac, HYDROcodone-acetaminophen    Data:     Past Medical History:   has a past medical history of Anxiety and depression; Asthma; Back injury; Cataracts, bilateral; COPD (chronic obstructive pulmonary disease) ([facility] Gastroesophageal reflux disease without esophagitis; H/O blood clots; Headache(784.0); Heart attack ([facility] Hypertension; Knee injury; Osteoarthritis; Psoriasis; and PTSD (post-traumatic stress disorder). Social History:   reports that she has been smoking Cigarettes. She has a 11.25 pack-year smoking history. She has never used smokeless tobacco. She reports that she does not drink alcohol or use drugs. Family History:   Family History   Problem Relation Age of Onset    Heart Disease Mother     High Blood Pressure Mother     Heart Disease Maternal Grandfather     Stroke Maternal Grandfather        Vitals:  BP (!) 159/74   Pulse 72   Temp 98.4 °F (36.9 °C) (Oral)   Resp 16   Ht 5' 3\" (1.6 m)   Wt 199 lb 15.3 oz (90.7 kg)   SpO2 96%   BMI 35.42 kg/m²   Temp (24hrs), Av.2 °F (36.8 °C), Min:97.6 °F (36.4 °C), Max:99.3 °F (37.4 °C)    No results for input(s): POCGLU in the last 72 hours. I/O (24Hr):     Intake/Output Summary (Last 24 hours) at 18 1142  Last data filed at 18 0611   Gross per 24 hour   Intake             1764 ml   Output                0 ml   Net             1764 ml       Labs:    [unfilled]    Lab Results   Component Value Date/Time    SPECIAL 10ML LAC 2018 02:00 AM     Lab Results   Component

## 2018-11-29 ENCOUNTER — TELEPHONE (OUTPATIENT)
Dept: PULMONOLOGY | Age: 58
End: 2018-11-29

## 2018-11-29 NOTE — TELEPHONE ENCOUNTER
----- Message from Maday Lieberman MD sent at 11/28/2018  6:42 PM EST -----  Please make appointment as requested. Thank you  ----- Message -----  From: Gabo Romero MD  Sent: 11/28/2018   6:22 PM  To:  Maday Lieberman MD

## 2018-12-03 LAB
CULTURE: NORMAL
CULTURE: NORMAL
Lab: NORMAL
Lab: NORMAL
SPECIMEN DESCRIPTION: NORMAL
SPECIMEN DESCRIPTION: NORMAL
STATUS: NORMAL
STATUS: NORMAL

## 2018-12-20 ENCOUNTER — PROCEDURE VISIT (OUTPATIENT)
Dept: NEUROLOGY | Age: 58
End: 2018-12-20
Payer: MEDICARE

## 2018-12-20 DIAGNOSIS — G43.711 CHRONIC MIGRAINE WITHOUT AURA, WITH INTRACTABLE MIGRAINE, SO STATED, WITH STATUS MIGRAINOSUS: Primary | ICD-10-CM

## 2018-12-20 PROCEDURE — 64615 CHEMODENERV MUSC MIGRAINE: CPT | Performed by: PSYCHIATRY & NEUROLOGY

## 2019-01-09 PROBLEM — S83.241A ACUTE MEDIAL MENISCAL TEAR, RIGHT, INITIAL ENCOUNTER: Status: ACTIVE | Noted: 2018-02-08

## 2019-01-09 PROBLEM — E78.2 MIXED HYPERLIPIDEMIA: Status: ACTIVE | Noted: 2018-12-06

## 2019-01-09 PROBLEM — I21.4 NSTEMI (NON-ST ELEVATED MYOCARDIAL INFARCTION) (HCC): Status: ACTIVE | Noted: 2018-12-06

## 2019-01-09 PROBLEM — R94.39 ABNORMAL STRESS ECG: Status: ACTIVE | Noted: 2018-06-21

## 2019-01-09 PROBLEM — M94.261 CHONDROMALACIA OF RIGHT KNEE: Status: ACTIVE | Noted: 2018-02-08

## 2019-01-09 PROBLEM — Z72.0 TOBACCO ABUSE: Status: ACTIVE | Noted: 2019-01-09

## 2019-01-09 PROBLEM — Z98.61 HISTORY OF PTCA: Status: ACTIVE | Noted: 2018-12-06

## 2019-01-28 ENCOUNTER — OFFICE VISIT (OUTPATIENT)
Dept: NEUROLOGY | Age: 59
End: 2019-01-28
Payer: MEDICARE

## 2019-01-28 ENCOUNTER — TELEPHONE (OUTPATIENT)
Dept: NEUROLOGY | Age: 59
End: 2019-01-28

## 2019-01-28 VITALS
SYSTOLIC BLOOD PRESSURE: 156 MMHG | HEART RATE: 76 BPM | DIASTOLIC BLOOD PRESSURE: 84 MMHG | BODY MASS INDEX: 36.21 KG/M2 | WEIGHT: 204.4 LBS | HEIGHT: 63 IN

## 2019-01-28 DIAGNOSIS — G43.711 CHRONIC MIGRAINE WITHOUT AURA, WITH INTRACTABLE MIGRAINE, SO STATED, WITH STATUS MIGRAINOSUS: Primary | ICD-10-CM

## 2019-01-28 PROCEDURE — G8482 FLU IMMUNIZE ORDER/ADMIN: HCPCS | Performed by: PSYCHIATRY & NEUROLOGY

## 2019-01-28 PROCEDURE — 3017F COLORECTAL CA SCREEN DOC REV: CPT | Performed by: PSYCHIATRY & NEUROLOGY

## 2019-01-28 PROCEDURE — G8417 CALC BMI ABV UP PARAM F/U: HCPCS | Performed by: PSYCHIATRY & NEUROLOGY

## 2019-01-28 PROCEDURE — 4004F PT TOBACCO SCREEN RCVD TLK: CPT | Performed by: PSYCHIATRY & NEUROLOGY

## 2019-01-28 PROCEDURE — G8427 DOCREV CUR MEDS BY ELIG CLIN: HCPCS | Performed by: PSYCHIATRY & NEUROLOGY

## 2019-01-28 PROCEDURE — 99213 OFFICE O/P EST LOW 20 MIN: CPT | Performed by: PSYCHIATRY & NEUROLOGY

## 2019-01-28 PROCEDURE — G8598 ASA/ANTIPLAT THER USED: HCPCS | Performed by: PSYCHIATRY & NEUROLOGY

## 2019-02-06 RX ORDER — TOPIRAMATE 50 MG/1
150 TABLET, FILM COATED ORAL DAILY
Qty: 90 TABLET | Refills: 1 | Status: SHIPPED | OUTPATIENT
Start: 2019-02-06 | End: 2019-04-02 | Stop reason: SDUPTHER

## 2019-03-25 ENCOUNTER — PROCEDURE VISIT (OUTPATIENT)
Dept: NEUROLOGY | Age: 59
End: 2019-03-25
Payer: MEDICARE

## 2019-03-25 DIAGNOSIS — G43.711 CHRONIC MIGRAINE WITHOUT AURA, WITH INTRACTABLE MIGRAINE, SO STATED, WITH STATUS MIGRAINOSUS: Primary | ICD-10-CM

## 2019-03-25 PROCEDURE — 64615 CHEMODENERV MUSC MIGRAINE: CPT | Performed by: PSYCHIATRY & NEUROLOGY

## 2019-04-03 RX ORDER — TOPIRAMATE 50 MG/1
150 TABLET, FILM COATED ORAL DAILY
Qty: 90 TABLET | Refills: 0 | Status: SHIPPED | OUTPATIENT
Start: 2019-04-03 | End: 2021-02-18 | Stop reason: SDUPTHER

## 2019-04-23 ENCOUNTER — OFFICE VISIT (OUTPATIENT)
Dept: NEUROLOGY | Age: 59
End: 2019-04-23
Payer: MEDICARE

## 2019-04-23 ENCOUNTER — TELEPHONE (OUTPATIENT)
Dept: NEUROLOGY | Age: 59
End: 2019-04-23

## 2019-04-23 VITALS
DIASTOLIC BLOOD PRESSURE: 69 MMHG | HEART RATE: 67 BPM | WEIGHT: 205.2 LBS | SYSTOLIC BLOOD PRESSURE: 117 MMHG | BODY MASS INDEX: 35.03 KG/M2 | HEIGHT: 64 IN

## 2019-04-23 DIAGNOSIS — G43.711 CHRONIC MIGRAINE WITHOUT AURA, WITH INTRACTABLE MIGRAINE, SO STATED, WITH STATUS MIGRAINOSUS: Primary | ICD-10-CM

## 2019-04-23 PROCEDURE — G8598 ASA/ANTIPLAT THER USED: HCPCS | Performed by: PSYCHIATRY & NEUROLOGY

## 2019-04-23 PROCEDURE — G8417 CALC BMI ABV UP PARAM F/U: HCPCS | Performed by: PSYCHIATRY & NEUROLOGY

## 2019-04-23 PROCEDURE — 1036F TOBACCO NON-USER: CPT | Performed by: PSYCHIATRY & NEUROLOGY

## 2019-04-23 PROCEDURE — 3017F COLORECTAL CA SCREEN DOC REV: CPT | Performed by: PSYCHIATRY & NEUROLOGY

## 2019-04-23 PROCEDURE — 99214 OFFICE O/P EST MOD 30 MIN: CPT | Performed by: PSYCHIATRY & NEUROLOGY

## 2019-04-23 PROCEDURE — G8427 DOCREV CUR MEDS BY ELIG CLIN: HCPCS | Performed by: PSYCHIATRY & NEUROLOGY

## 2019-04-23 RX ORDER — CHLORDIAZEPOXIDE HYDROCHLORIDE AND CLIDINIUM BROMIDE 5; 2.5 MG/1; MG/1
1 CAPSULE ORAL DAILY PRN
Qty: 30 CAPSULE | Refills: 3 | Status: SHIPPED | OUTPATIENT
Start: 2019-04-23 | End: 2021-12-13

## 2019-04-23 RX ORDER — CEFUROXIME AXETIL 250 MG/1
2 TABLET ORAL PRN
Qty: 2 SYRINGE | Refills: 3 | Status: SHIPPED | OUTPATIENT
Start: 2019-04-23 | End: 2022-02-16 | Stop reason: SDUPTHER

## 2019-04-23 NOTE — PROGRESS NOTES
Constitutional Weight: absent, Appetite: absent, Fatigue: present   HEENT Ears: normal, Eyes: glasses, Visual disturbance: absent   Reespiratory Shortness of breath: present, Cough: present   Cardivascular Chest pain: absent, Leg swelling :absent   GI Constipation: absent, Diarrhea: absent, Swallowing change: absent    Urinary frequency: absent, Urinary urgency: absent, Urinary incontinence: absent   Musculoskeletal Neck pain: present, Back pain: present, Stiffness: present, Muscle pain: present, Joint pain:present, Restless Legs: present   Dermatological Hair loss: absent, Skin changes: absent   Neurological Memory loss: absent, Confusion: absent, Seizures: absent Trouble walking or imbalance: absent, Dizziness: present, Weakness:present, Numbness: present, Tremor: absent, Spasm:absent, Speech difficulty: absent, Headache: absent, Light sensitivity: absent   Psychiatric Anxiety: present, Hallucination: absent, Mood disorder: absent, depression   Hematologic Abnormal bleeding: absent, Anemia: absent, Clotting disorder: absent, Lymph gland changes: absent
midline tongue without atrophy or fasciculation     Motor function  Normal muscle bulk and tone; normal power 5/5, including fine motor movements     Sensory function Intact to touch, pin, vibration, proprioception     Cerebellar Intact fine motor movement. No involuntary movements or tremors     Reflex function Intact 2+ DTR and symmetric. Negative Babinski     Gait                  Normal station and gait       Assessment Recommendations:  Chronic medically intractable headache    From the Botox injections, the patient has had significant improvement in her headaches and essentially has been headache free. In an attempt to stratify her medications, I have advised her to taper down topiramate to 75 mg by mouth daily at bedtime. The dose can further be decreased if there is no recurrence of the headaches. The patient understands that she will contact me if the headaches recur and in that case topiramate can be started again. I will refill patient's Midrin and Imitrex. Patient to follow up with me in next 2 months for Botox.

## 2019-05-03 ENCOUNTER — TELEPHONE (OUTPATIENT)
Dept: NEUROLOGY | Age: 59
End: 2019-05-03

## 2019-05-03 NOTE — TELEPHONE ENCOUNTER
We received a refill request for Topamax. I called [facility] to verify her Topamax dose. She had reduced her dose to 50 mg at night and her headaches came back. She went back to taking 100 mg nightly.

## 2019-07-18 ENCOUNTER — TELEPHONE (OUTPATIENT)
Dept: NEUROLOGY | Age: 59
End: 2019-07-18

## 2021-02-18 ENCOUNTER — TELEPHONE (OUTPATIENT)
Dept: NEUROLOGY | Age: 61
End: 2021-02-18

## 2021-02-18 ENCOUNTER — TELEMEDICINE (OUTPATIENT)
Dept: NEUROLOGY | Age: 61
End: 2021-02-18
Payer: MEDICARE

## 2021-02-18 DIAGNOSIS — G43.009 MIGRAINE WITHOUT AURA AND WITHOUT STATUS MIGRAINOSUS, NOT INTRACTABLE: Primary | ICD-10-CM

## 2021-02-18 PROCEDURE — G8484 FLU IMMUNIZE NO ADMIN: HCPCS | Performed by: PSYCHIATRY & NEUROLOGY

## 2021-02-18 PROCEDURE — G8427 DOCREV CUR MEDS BY ELIG CLIN: HCPCS | Performed by: PSYCHIATRY & NEUROLOGY

## 2021-02-18 PROCEDURE — 4004F PT TOBACCO SCREEN RCVD TLK: CPT | Performed by: PSYCHIATRY & NEUROLOGY

## 2021-02-18 PROCEDURE — G8421 BMI NOT CALCULATED: HCPCS | Performed by: PSYCHIATRY & NEUROLOGY

## 2021-02-18 PROCEDURE — 99213 OFFICE O/P EST LOW 20 MIN: CPT | Performed by: PSYCHIATRY & NEUROLOGY

## 2021-02-18 PROCEDURE — 3017F COLORECTAL CA SCREEN DOC REV: CPT | Performed by: PSYCHIATRY & NEUROLOGY

## 2021-02-18 RX ORDER — TOPIRAMATE 50 MG/1
150 TABLET, FILM COATED ORAL DAILY
Qty: 270 TABLET | Refills: 2 | Status: SHIPPED | OUTPATIENT
Start: 2021-02-18 | End: 2021-06-09 | Stop reason: SDUPTHER

## 2021-02-18 RX ORDER — RIZATRIPTAN BENZOATE 5 MG/1
TABLET, ORALLY DISINTEGRATING ORAL
Qty: 10 TABLET | Refills: 3 | Status: SHIPPED | OUTPATIENT
Start: 2021-02-18 | End: 2022-09-20 | Stop reason: SDUPTHER

## 2021-02-18 NOTE — PROGRESS NOTES
[facility]  [facility], [address], [address] [facility]  Ph: [phone] or [phone]  FAX: [phone]        The patient comes in today as a follow visit for her chronic migraine headaches, medically intractable with status cyanosis. She has been headache free since the Botox injection except for one headache. Currently, she is taking Lyrica 100 mg twice a day and topiramate 50 mg three times daily. Today, the patient reports medication compliance. She moved to [place] for approximately one year where she was not receiving Botox injections. Headaches have since returned and she admits relief while on Botox injections and is interested in re-initiating them.       General examination:    Head: Normocephalic, atraumatic  Eyes: Extraocular movements intact  Lungs: Respirations unlabored, chest wall no deformity  ENT: Normal external ear canals, no sinus tenderness  Heart: Regular rate rhythm  Abdomen: No masses, tenderness  Extremities: No cyanosis or edema, 2+ pulses  Skin: Intact, normal skin color    Neurological examination:    Mental status   Alert and oriented; intact memory with no confusion, speech or language problems; no hallucinations or delusions     Cranial nerves   II - visual fields intact to confrontation                                                III, IV, VI - extra-ocular muscles full: no pupillary defect; no CAITLYN, no nystagmus, no ptosis   V - normal facial sensation                                                               VII - normal facial symmetry                                                             VIII - intact hearing                                                                             IX, X - symmetrical palate                                                                  XI - symmetrical shoulder shrug                                                       XII - midline tongue without atrophy or fasciculation     Motor function Normal muscle bulk and tone; normal power 5/5, including fine motor movements     Sensory function Intact to touch, pin, vibration, proprioception     Cerebellar Intact fine motor movement. No involuntary movements or tremors     Reflex function Intact 2+ DTR and symmetric. Negative Babinski     Gait                  Normal station and gait       Assessment Recommendations:  Chronic medically intractable headache    From the Botox injections, the patient has had significant improvement in her headaches and essentially has been headache free. She discontinued the injections when she moved but has since returned to [place]. I re-initiated botox injections. Patient to follow up with me in next 2 months for Botox. Scribe Attestation:   By signing my name below, Morelia Regan, attest that this documentation has been prepared under the direction and in the presence of Amy Garcia MD.    Electronically Signed: Julio Barnard. 2/18/2021 9:30 AM    Physician Attestation:  Jim Rice MD, personally performed the services described in this documentation. All medical record entries made by the scribe were at my direction and in my presence. I have reviewed the chart and discharge instructions (if applicable) and agree that the record reflects my personal performance and is accurate and complete.     Electronically Signed: Mehran Stubbs 2/18/2021 9:30 AM    Diplomate, American Board of Psychiatry and Neurology  Diplomate, American Board of Clinical Neurophysiology  Diplomate, American Board of Epilepsy

## 2021-03-11 ENCOUNTER — PROCEDURE VISIT (OUTPATIENT)
Dept: NEUROLOGY | Age: 61
End: 2021-03-11
Payer: MEDICARE

## 2021-03-11 DIAGNOSIS — G43.009 MIGRAINE WITHOUT AURA AND WITHOUT STATUS MIGRAINOSUS, NOT INTRACTABLE: Primary | ICD-10-CM

## 2021-03-11 PROCEDURE — 64615 CHEMODENERV MUSC MIGRAINE: CPT | Performed by: PSYCHIATRY & NEUROLOGY

## 2021-06-08 RX ORDER — ISOSORBIDE MONONITRATE 30 MG/1
TABLET, EXTENDED RELEASE ORAL
COMMUNITY
Start: 2021-03-09

## 2021-06-09 ENCOUNTER — TELEMEDICINE (OUTPATIENT)
Dept: NEUROLOGY | Age: 61
End: 2021-06-09
Payer: MEDICARE

## 2021-06-09 DIAGNOSIS — G43.009 MIGRAINE WITHOUT AURA AND WITHOUT STATUS MIGRAINOSUS, NOT INTRACTABLE: Primary | ICD-10-CM

## 2021-06-09 PROCEDURE — G8421 BMI NOT CALCULATED: HCPCS | Performed by: PSYCHIATRY & NEUROLOGY

## 2021-06-09 PROCEDURE — G8427 DOCREV CUR MEDS BY ELIG CLIN: HCPCS | Performed by: PSYCHIATRY & NEUROLOGY

## 2021-06-09 PROCEDURE — 3017F COLORECTAL CA SCREEN DOC REV: CPT | Performed by: PSYCHIATRY & NEUROLOGY

## 2021-06-09 PROCEDURE — 99214 OFFICE O/P EST MOD 30 MIN: CPT | Performed by: PSYCHIATRY & NEUROLOGY

## 2021-06-09 PROCEDURE — 4004F PT TOBACCO SCREEN RCVD TLK: CPT | Performed by: PSYCHIATRY & NEUROLOGY

## 2021-06-09 RX ORDER — BUTALBITAL, ACETAMINOPHEN AND CAFFEINE 50; 325; 40 MG/1; MG/1; MG/1
1 TABLET ORAL DAILY PRN
Qty: 60 TABLET | Refills: 1 | Status: SHIPPED | OUTPATIENT
Start: 2021-06-09 | End: 2021-12-13

## 2021-06-09 RX ORDER — TOPIRAMATE 50 MG/1
150 TABLET, FILM COATED ORAL DAILY
Qty: 270 TABLET | Refills: 2 | Status: SHIPPED | OUTPATIENT
Start: 2021-06-09 | End: 2022-02-28

## 2021-06-09 NOTE — PROGRESS NOTES
[facility]  [facility], [address] [place] [address], [address] [place] [address]  Ph: [phone] or [phone]  FAX: [phone]        The patient comes in today as a follow visit for her chronic migraine headaches, medically intractable with status cyanosis. She has been headache free since the Botox injection except for one headache. Currently, she is taking Lyrica 100 mg twice a day and topiramate 50 mg three times daily. Today, the patient reports medication compliance. She moved to [place] for approximately one year where she was not receiving Botox injections. Headaches have since returned and she admits relief while on Botox injections and is interested in re-initiating them. Today, the patient reports significant relief with Botox injections. She approximates 6 headaches a month. Patient takes Fioricet for these episodes with good relief. When Fioricet is not effective, patient uses Maxalt with success. Patient has not been taking Topamax due to confusion with the pharmacy.       General examination:    Head: Normocephalic, atraumatic  Eyes: Extraocular movements intact  Lungs: Respirations unlabored, chest wall no deformity  ENT: Normal external ear canals, no sinus tenderness  Heart: Regular rate rhythm  Abdomen: No masses, tenderness  Extremities: No cyanosis or edema, 2+ pulses  Skin: Intact, normal skin color    Neurological examination:    Mental status   Alert and oriented; intact memory with no confusion, speech or language problems; no hallucinations or delusions     Cranial nerves   II - visual fields intact to confrontation                                                III, IV, VI  extra-ocular muscles full: no pupillary defect; no CAITLYN, no nystagmus, no ptosis   V - normal facial sensation                                                               VII - normal facial symmetry                                                             VIII - intact hearing IX, X - symmetrical palate                                                                  XI - symmetrical shoulder shrug                                                       XII - midline tongue without atrophy or fasciculation     Motor function  Normal muscle bulk and tone; normal power 5/5, including fine motor movements     Sensory function Intact to touch, pin, vibration, proprioception     Cerebellar Intact fine motor movement. No involuntary movements or tremors     Reflex function Intact 2+ DTR and symmetric. Negative Babinski     Gait                  Normal station and gait       Assessment Recommendations:  Chronic medically intractable headache    From the Botox injections, the patient has had significant improvement in her headaches and essentially has been headache free. She discontinued the injections when she moved but has since returned to [place]. Continue botox injections. Patient discontinued Topamax due to confusion with pharmacy and has seen a decline in symptomatic control since. I re-initiated Topamax 50 mg three times daily. Medication side effects were discussed and questions were answered. Patient to follow up with me in one week for Botox injections or sooner if symptoms worsen or side effects are observed. Scribe Attestation:   By signing my name below, Lisette Wagoner, attest that this documentation has been prepared under the direction and in the presence of Yojana Leahy MD.    Electronically Signed: Maria Guadalupe Kim. 6/9/2021 10:51 AM    Physician Attestation:  Dell Deleon MD, personally performed the services described in this documentation. All medical record entries made by the scribe were at my direction and in my presence. I have reviewed the chart and discharge instructions (if applicable) and agree that the record reflects my personal performance and is accurate and complete.     Electronically Signed: RENE CRISTA DOUGHERTY. 6/9/2021 10:51 AM    Diplomate, American Board of Psychiatry and Neurology  Diplomate, American Board of Clinical Neurophysiology  Diplomate, American Board of Epilepsy

## 2021-06-16 ENCOUNTER — PROCEDURE VISIT (OUTPATIENT)
Dept: NEUROLOGY | Age: 61
End: 2021-06-16
Payer: MEDICARE

## 2021-06-16 ENCOUNTER — TELEPHONE (OUTPATIENT)
Dept: NEUROLOGY | Age: 61
End: 2021-06-16

## 2021-06-16 DIAGNOSIS — G43.009 MIGRAINE WITHOUT AURA AND WITHOUT STATUS MIGRAINOSUS, NOT INTRACTABLE: Primary | ICD-10-CM

## 2021-06-16 PROCEDURE — 64615 CHEMODENERV MUSC MIGRAINE: CPT | Performed by: PSYCHIATRY & NEUROLOGY

## 2021-06-25 NOTE — PROGRESS NOTES
[facility]  [facility], [address], [address] [place] [address]  Ph: [phone] or [phone]  FAX: [phone]    Sofi Ricci M.D.  ANA Kraft M.D. Knute Fortune, M.D. Indication for treatment: Chronic daily headaches   Consent form was signed. Please see the associated scanned paper. Potential risks and benefits for the procedure were explained to the patient. Procedure: 30-gauge half inch needle was used and 200 U vial Botox was prepared with 4ml 0.9% NS.155 units of Botox were used and 45 units of Botox were discarded.    Botox was injected at 31 different sites as follows:   Order  Muscle  Units injected    A  Corrugator1  10 units at 2 div sites    B  Procerus  5 Units in 1 site    C  Frontalis¹  20 Units div. 4 sites    D  Temporalis¹  40 Units div 8 site    E  Occipitalis¹  30 Units div. 6 sites    F  Cervical paraspinal muscles¹  20 Units div 4 sites    G  Trapezius¹  30 Units div 6 sites    Total Dose  155 Units div 31 sites    2 Dose distributed bilaterally  Blood loss: Less than 1 cc  Complications: none

## 2021-09-08 ENCOUNTER — TELEMEDICINE (OUTPATIENT)
Dept: NEUROLOGY | Age: 61
End: 2021-09-08
Payer: MEDICARE

## 2021-09-08 DIAGNOSIS — G43.711 CHRONIC MIGRAINE WITHOUT AURA, WITH INTRACTABLE MIGRAINE, SO STATED, WITH STATUS MIGRAINOSUS: Primary | ICD-10-CM

## 2021-09-08 PROCEDURE — 4004F PT TOBACCO SCREEN RCVD TLK: CPT | Performed by: PSYCHIATRY & NEUROLOGY

## 2021-09-08 PROCEDURE — G8427 DOCREV CUR MEDS BY ELIG CLIN: HCPCS | Performed by: PSYCHIATRY & NEUROLOGY

## 2021-09-08 PROCEDURE — 99213 OFFICE O/P EST LOW 20 MIN: CPT | Performed by: PSYCHIATRY & NEUROLOGY

## 2021-09-08 PROCEDURE — 3017F COLORECTAL CA SCREEN DOC REV: CPT | Performed by: PSYCHIATRY & NEUROLOGY

## 2021-09-08 PROCEDURE — G8421 BMI NOT CALCULATED: HCPCS | Performed by: PSYCHIATRY & NEUROLOGY

## 2021-09-08 NOTE — PROGRESS NOTES
[facility], [address], [address] [facility]  Ph: [phone] or [phone]  FAX: [phone]    This is a telehealth visit    Chief Complaint: Migraines      Dear JOSEMANUEL Berrios - CNP     I had the pleasure of seeing your patient today in neurology consultation for her symptoms. As you would recall Giovanna Tobias is a 64 y.o. female. She previously visited the clinic for her chronic migraine headaches, medically intractable with status cyanosis. She has been headache free since the Botox injection except for one headache. Currently, she is taking Lyrica 100 mg twice a day and topiramate 50 mg three times daily. She reports medication compliance. Patient previously reported she moved to [place] for approximately one year during which she was not receiving Botox injections. Botox was re-initiated upon her return to [place] as she admitted to relief. During a previous visit, she approximated 6 headaches a month. Patient takes Fioricet for these episodes with good relief. When Fioricet is not effective, patient uses Maxalt with success. Today, patient reports medication compliance with topiramate 50 mg TID. Patient has noticed decreased in occurrence of headaches. She also reports nauseous sensation when eating. States she feel she need to produce emesis despite not eating well. Patient reports she has tried to eat multiple small portions of food but continues to feel nauseous.      Past Medical History:   Diagnosis Date    Anxiety and depression     Asthma     Back injury     Cataracts, bilateral     COPD (chronic obstructive pulmonary disease) ([facility] 8/13/2015    Gastroesophageal reflux disease without esophagitis 8/13/2015    H/O blood clots     Headache(784.0)     Heart attack ([address]     Hypertension     Knee injury     Osteoarthritis     Psoriasis     PTSD (post-traumatic stress disorder)      Past Surgical History:   Procedure Laterality Date  CERVIX SURGERY  1997/1998    ELBOW SURGERY      JOINT REPLACEMENT Right 10/28/2020    KNEE SURGERY      LEEP      NERVE BLOCK  2018     Allergies   Allergen Reactions    Aspartame      Other reaction(s): Headache    Benadryl [Diphenhydramine] Other (See Comments)     Shaking and Restless Legs  IV benadryl causes tremor  Shaking and Restless Legs    Celebrex [Celecoxib] Other (See Comments)     Other reaction(s): GI Disturbance  Upset Stomach, Nausea, Vomiting  Upset Stomach, Nausea, Vomiting    Codeine Other (See Comments)     Sensitive, get \"loopy\"    Demerol Hcl [Meperidine] Other (See Comments)     Other reaction(s): Hallucinations  Makes the patient feel \"out of in\"   Makes the patient feel \"out of in\"     Gabapentin Other (See Comments)     Other reaction(s): Abnormal Behavior  Violent behavior    Morphine Other (See Comments)     Other reaction(s): Hallucinations  hallucinations    Statins Swelling     Facial swelling - PT is unsure if this is a true allergy    Other Other (See Comments)     sneezing    Aspartame And Phenylalanine Other (See Comments)     Headache    Phenylalanine Other (See Comments)     Headache  - DAIRY BASED PROTEIN     Family History   Problem Relation Age of Onset    Heart Disease Mother     High Blood Pressure Mother     Heart Disease Maternal Grandfather     Stroke Maternal Grandfather       Social History     Socioeconomic History    Marital status: Single     Spouse name: Not on file    Number of children: Not on file    Years of education: Not on file    Highest education level: Not on file   Occupational History    Occupation:    Tobacco Use    Smoking status: Current Every Day Smoker     Packs/day: 0.50     Years: 45.00     Pack years: 22.50     Types: Cigarettes    Smokeless tobacco: Never Used   Vaping Use    Vaping Use: Some days    Substances: Nicotine   Substance and Sexual Activity    Alcohol use: No     Alcohol/week: 0.0 standard drinks    Drug use: No    Sexual activity: Not on file   Other Topics Concern    Not on file   Social History Narrative    Raising 3 of her 9 grandchildren     Social Determinants of Health     Financial Resource Strain:     Difficulty of Paying Living Expenses:    Food Insecurity:     Worried About Running Out of Food in the Last Year:     [address] in the Last Year:    Transportation Needs:     Lack of Transportation (Medical):  Lack of Transportation (Non-Medical):    Physical Activity:     Days of Exercise per Week:     Minutes of Exercise per Session:    Stress:     Feeling of Stress :    Social Connections:     Frequency of Communication with Friends and Family:     Frequency of Social Gatherings with Friends and Family:     Attends Rastafari Services:     Active Member of Clubs or Organizations:     Attends Club or Organization Meetings:     Marital Status:    Intimate Partner Violence:     Fear of Current or Ex-Partner:     Emotionally Abused:     Physically Abused:     Sexually Abused: There were no vitals taken for this visit.      HEENT [] Hearing Loss  [] Visual Disturbance  [] Tinnitus  [] Eye pain   Respiratory [] Shortness of Breath  [] Cough  [] Snoring   Cardiovascular [] Chest Pain  [] Palpitations  [] Lightheaded   GI [] Constipation  [] Diarrhea  [] Swallowing change  [x] Nausea/vomiting    [] Urinary Frequency  [] Urinary Urgency   Musculoskeletal [] Neck pain  [] Back pain  [] Muscle pain  [] Restless legs   Dermatologic [] Skin changes   Neurologic [] Memory loss/confusion  [] Seizures  [] Trouble walking or imbalance  [] Dizziness  [] Sleep disturbance  [] Weakness  [] Numbness  [] Tremors  [] Speech Difficulty  [] Headaches  [] Light Sensitivity  [] Sound Sensitivity   Endocrinology []Excessive thirst  []Excessive hunger   Psychiatric [] Anxiety/Depression  [] Hallucination   Allergy/immunology []Hives/environmental allergies   Hematologic/lymph [] decline in symptomatic control after she self-stopped medication due to confusion with pharmacy. Patient has benefited from Topomax 50 mg TID but we will begin to taper patient off of medication as per her request. I recommend patient take Topomax 50 mg BID for 1 week, then Topomax 50 mg OD for 1 week, then stop medication. Patient to follow up as scheduled for Botox appointment. Patient may call office if current symptoms worsen or new side effects are observed. Scribe Attestation:   By signing my name below, I, Dieter Vizcaino, attest that this documentation has been prepared under the direction and in the presence of Abiola Spann MD.    Electronically Signed: Dieter Vizcaino. 9/8/2021 12:24 PM    Physician Attestation:  Nixon Contreras MD, personally performed the services described in this documentation. All medical record entries made by the scribe were at my direction and in my presence. I have reviewed the chart and discharge instructions (if applicable) and agree that the record reflects my personal performance and is accurate and complete.     Electronically Signed: Cheryl Cuevas 9/8/2021 12:24 PM    Diplomate, American Board of Psychiatry and Neurology  Diplomate, American Board of Clinical Neurophysiology  Diplomate, American Board of Epilepsy

## 2021-09-14 ENCOUNTER — PROCEDURE VISIT (OUTPATIENT)
Dept: NEUROLOGY | Age: 61
End: 2021-09-14
Payer: MEDICARE

## 2021-09-14 DIAGNOSIS — G43.009 MIGRAINE WITHOUT AURA AND WITHOUT STATUS MIGRAINOSUS, NOT INTRACTABLE: Primary | ICD-10-CM

## 2021-09-14 PROCEDURE — 64615 CHEMODENERV MUSC MIGRAINE: CPT | Performed by: PSYCHIATRY & NEUROLOGY

## 2021-09-15 NOTE — PROGRESS NOTES
[facility]  [facility], [address], [address] [place] [address]  Ph: [phone] or [phone]  FAX: [phone]    Lionel Dolan M.D.  Keegan Pollard M.D. Yoly Martínez M.D. Lencho Morris M.D. Indication for treatment: Chronic migraine without aura, intractable, with status migrainosus (G 43.551)  Consent form was signed. Please see the associated scanned paper. Potential risks and benefits for the procedure were explained to the patient. Procedure: 30-gauge half inch needle was used and 200 U vial Botox was prepared with 4ml 0.9% NS.155 units of Botox were used and 45 units of Botox were discarded.    Botox was injected at 31 different sites as follows:   Order  Muscle  Units injected    A  Corrugator1  10 units at 2 div sites    B  Procerus  5 Units in 1 site    C  Frontalis¹  20 Units div. 4 sites    D  Temporalis¹  40 Units div 8 site    E  Occipitalis¹  30 Units div. 6 sites    F  Cervical paraspinal muscles¹  20 Units div 4 sites    G  Trapezius¹  30 Units div 6 sites    Total Dose  155 Units div 31 sites    2 Dose distributed bilaterally  Blood loss: Less than 1 cc  Complications: none

## 2021-09-24 ENCOUNTER — TELEPHONE (OUTPATIENT)
Dept: NEUROLOGY | Age: 61
End: 2021-09-24

## 2021-09-24 NOTE — TELEPHONE ENCOUNTER
Manoj Merida called in stating she weaned off of her Topamax completely on Wednesday 09/22/2021 and she has had a headache constantly since  last Wednesday, September 09/15/2021. She hasn't tried her abrtive medications yet because she was under the understanding they were only for migraines. I advised her to try a Fioricet to see if that helps. I reminded her she has Maxalt as well. She is interested in other preventative headache options.  Please advise

## 2021-09-27 NOTE — TELEPHONE ENCOUNTER
Is an option to start Topamax again. However with patient not willing, Aimovig injection regularly remains an option. We can give Aimovig 70 mg injection once a month if the patient is agreeable.   Thank you

## 2021-12-08 ENCOUNTER — TELEPHONE (OUTPATIENT)
Dept: NEUROLOGY | Age: 61
End: 2021-12-08

## 2021-12-08 NOTE — TELEPHONE ENCOUNTER
There is no contraindication to proceed both with injections. However if the patient wants to delay Botox for 1 week we can do that.

## 2021-12-13 ENCOUNTER — PROCEDURE VISIT (OUTPATIENT)
Dept: NEUROLOGY | Age: 61
End: 2021-12-13
Payer: MEDICARE

## 2021-12-13 VITALS — TEMPERATURE: 97.2 F

## 2021-12-13 DIAGNOSIS — G43.009 MIGRAINE WITHOUT AURA AND WITHOUT STATUS MIGRAINOSUS, NOT INTRACTABLE: Primary | ICD-10-CM

## 2021-12-13 PROCEDURE — 64615 CHEMODENERV MUSC MIGRAINE: CPT | Performed by: PSYCHIATRY & NEUROLOGY

## 2021-12-13 RX ORDER — CHOLECALCIFEROL (VITAMIN D3) 125 MCG
CAPSULE ORAL
COMMUNITY
Start: 2021-12-06

## 2021-12-13 RX ORDER — TIZANIDINE 4 MG/1
TABLET ORAL
COMMUNITY
Start: 2021-12-12

## 2021-12-13 RX ORDER — UBROGEPANT 50 MG/1
50 TABLET ORAL PRN
Qty: 30 TABLET | Refills: 1 | Status: SHIPPED | OUTPATIENT
Start: 2021-12-13 | End: 2022-01-12

## 2022-01-07 ENCOUNTER — TELEPHONE (OUTPATIENT)
Dept: NEUROLOGY | Age: 62
End: 2022-01-07

## 2022-01-10 DIAGNOSIS — G43.711 CHRONIC MIGRAINE WITHOUT AURA, WITH INTRACTABLE MIGRAINE, SO STATED, WITH STATUS MIGRAINOSUS: ICD-10-CM

## 2022-01-10 DIAGNOSIS — G43.009 MIGRAINE WITHOUT AURA AND WITHOUT STATUS MIGRAINOSUS, NOT INTRACTABLE: Primary | ICD-10-CM

## 2022-01-10 NOTE — TELEPHONE ENCOUNTER
[place] denied the [place] and [place]. She has to have tried a triptan, which she did sumatriptan and she has to try Nurtec. Please advise.

## 2022-01-20 RX ORDER — RIMEGEPANT SULFATE 75 MG/75MG
1 TABLET, ORALLY DISINTEGRATING ORAL PRN
Qty: 8 TABLET | Refills: 1 | Status: SHIPPED | OUTPATIENT
Start: 2022-01-20 | End: 2022-02-19

## 2022-01-27 ENCOUNTER — TELEPHONE (OUTPATIENT)
Dept: NEUROLOGY | Age: 62
End: 2022-01-27

## 2022-02-14 NOTE — TELEPHONE ENCOUNTER
Patient is calling she has an appt with you on 12*/13/2021 to get botox done. she is also scheduled to get her covid shot that day is that safe to mix the 2 injections      Please advise.      Abiodun STYLES (1) Other Medications/None

## 2022-02-16 RX ORDER — CEFUROXIME AXETIL 250 MG/1
TABLET ORAL
Qty: 2 ML | Refills: 1 | Status: SHIPPED | OUTPATIENT
Start: 2022-02-16

## 2022-02-16 NOTE — TELEPHONE ENCOUNTER
Patient calling for refill of Imitrex Inj.      Medication active on med list yes      Date of last fill: 4/23/2019  verified on 2/16/2022   verified by [facility] LPN      Date of last appointment 12/13/2021    Next Visit Date:  3/16/2022

## 2022-02-28 NOTE — TELEPHONE ENCOUNTER
Pharmacy requesting refill of Topamax 50 mg.      Medication active on med list yes      Date of last Rx: 6/9/21 for 90 days with 2 refills          verified by AALIYAH NORRIS      Date of last appointment 12/13/2021    Next Visit Date:  4/14/2022

## 2022-03-01 RX ORDER — TOPIRAMATE 50 MG/1
TABLET, FILM COATED ORAL
Qty: 270 TABLET | Refills: 0 | Status: SHIPPED | OUTPATIENT
Start: 2022-03-01 | End: 2022-07-11

## 2022-03-16 ENCOUNTER — PROCEDURE VISIT (OUTPATIENT)
Dept: NEUROLOGY | Age: 62
End: 2022-03-16
Payer: MEDICARE

## 2022-03-16 DIAGNOSIS — G43.009 MIGRAINE WITHOUT AURA AND WITHOUT STATUS MIGRAINOSUS, NOT INTRACTABLE: Primary | ICD-10-CM

## 2022-03-16 PROCEDURE — 64615 CHEMODENERV MUSC MIGRAINE: CPT | Performed by: PSYCHIATRY & NEUROLOGY

## 2022-03-16 NOTE — PROGRESS NOTES
[facility]  [facility], [address], [address] [place] [address]  Ph: [phone] or [phone]  FAX: [phone]    Bernard Hernandez M.D.  ANA Dean M.D. Primo Echevarria M.D. Indication for treatment: Chronic migraine without aura, intractable, with status migrainosus (G 43.711)  Consent form was signed. Please see the associated scanned paper. Potential risks and benefits for the procedure were explained to the patient. Procedure: 30-gauge half inch needle was used and 200 U vial Botox was prepared with 4ml 0.9% NS.155 units of Botox were used and 45 units of Botox were discarded.    Botox was injected at 31 different sites as follows:   Order  Muscle  Units injected    A  Corrugator1  10 units at 2 div sites    B  Procerus  5 Units in 1 site    C  Frontalis¹  20 Units div. 4 sites    D  Temporalis¹  40 Units div 8 site    E  Occipitalis¹  30 Units div. 6 sites    F  Cervical paraspinal muscles¹  20 Units div 4 sites    G  Trapezius¹  30 Units div 6 sites    Total Dose  155 Units div 31 sites    2 Dose distributed bilaterally  Blood loss: Less than 1 cc  Complications: none

## 2022-04-14 ENCOUNTER — TELEMEDICINE (OUTPATIENT)
Dept: NEUROLOGY | Age: 62
End: 2022-04-14
Payer: MEDICARE

## 2022-04-14 DIAGNOSIS — G43.711 CHRONIC MIGRAINE WITHOUT AURA, WITH INTRACTABLE MIGRAINE, SO STATED, WITH STATUS MIGRAINOSUS: Primary | ICD-10-CM

## 2022-04-14 PROCEDURE — G8421 BMI NOT CALCULATED: HCPCS | Performed by: PSYCHIATRY & NEUROLOGY

## 2022-04-14 PROCEDURE — 99214 OFFICE O/P EST MOD 30 MIN: CPT | Performed by: PSYCHIATRY & NEUROLOGY

## 2022-04-14 PROCEDURE — 3017F COLORECTAL CA SCREEN DOC REV: CPT | Performed by: PSYCHIATRY & NEUROLOGY

## 2022-04-14 PROCEDURE — G8428 CUR MEDS NOT DOCUMENT: HCPCS | Performed by: PSYCHIATRY & NEUROLOGY

## 2022-04-14 PROCEDURE — 4004F PT TOBACCO SCREEN RCVD TLK: CPT | Performed by: PSYCHIATRY & NEUROLOGY

## 2022-04-14 RX ORDER — UBROGEPANT 50 MG/1
50 TABLET ORAL PRN
Qty: 30 TABLET | Refills: 1 | Status: SHIPPED | OUTPATIENT
Start: 2022-04-14 | End: 2022-05-14

## 2022-04-14 NOTE — PROGRESS NOTES
[facility]  [address], [address] [facility]  Ph: [phone] or [phone]  FAX: [phone]    This is a telehealth visit    Chief Complaint: Migraines      Dear Konstantin Odom, JOSEMANUEL - CNP     I had the pleasure of seeing your patient today in neurology consultation for her symptoms. As you would recall Madina Haas is a 63 y.o. female. She previously visited the clinic for her chronic migraine headaches, medically intractable with status cyanosis. She has been headache free since the Botox injection except for one headache. Currently, she is taking Lyrica 100 mg twice a day and topiramate 50 mg three times daily. She reports medication compliance. Patient previously reported she moved to [place] for approximately one year during which she was not receiving Botox injections. Botox was re-initiated upon her return to [address] [place] [address] as she admitted to relief. During a previous visit, she approximated 6 headaches a month. Patient takes Fioricet for these episodes with good relief. When Fioricet is not effective, patient uses Maxalt with success. Last visit 9/9/21, patient reports medication compliance with topiramate 50 mg TID. Patient has noticed decreased in occurrence of headaches. She also reports nauseous sensation when eating. States she feel she need to produce emesis despite not eating well. Patient reports she has tried to eat multiple small portions of food but continues to feel nauseous. Patient presented for a follow up on 4/14/22. Patient receives regular Botox injections and denies side-effects. Patient states her headaches are typically well controlled with Botox. She admits to some breakthrough headaches, for which she treated with Fioricet 2-3x per week. Patient also takes Topamax 50 mg in the mornings and 100 mg in the evenings. Patient also takes Imitrex injections if the pain is a severity level 5 or more out of 10.  Patient takes Maxalt dissolvables to treat abdominal migraines.      Current prophylactic medication Dosage   Botox injections    Topamax 50 mg         Previous prophylactic medications Reason for discontinuation                             Previous Abortive medications Reason for discontinuation   Imitrex    Maxalt                        Past Medical History:   Diagnosis Date    Anxiety and depression     Asthma     Back injury     Cataracts, bilateral     COPD (chronic obstructive pulmonary disease) ([facility] [address] 8/13/2015    Gastroesophageal reflux disease without esophagitis 8/13/2015    H/O blood clots     Headache(784.0)     Heart attack ([facility] [address]     Hypertension     Knee injury     Osteoarthritis     Psoriasis     PTSD (post-traumatic stress disorder)      Past Surgical History:   Procedure Laterality Date    CERVIX SURGERY  1997/1998    ELBOW SURGERY      JOINT REPLACEMENT Right 10/28/2020    KNEE SURGERY      LEEP      NERVE BLOCK  2018     Allergies   Allergen Reactions    Aspartame      Other reaction(s): Headache    Benadryl [Diphenhydramine] Other (See Comments)     Shaking and Restless Legs  IV benadryl causes tremor  Shaking and Restless Legs    Celebrex [Celecoxib] Other (See Comments)     Other reaction(s): GI Disturbance  Upset Stomach, Nausea, Vomiting  Upset Stomach, Nausea, Vomiting    Codeine Other (See Comments)     Sensitive, get \"loopy\"    Demerol Hcl [Meperidine] Other (See Comments)     Other reaction(s): Hallucinations  Makes the patient feel \"out of in\"   Makes the patient feel \"out of in\"     Gabapentin Other (See Comments)     Other reaction(s): Abnormal Behavior  Violent behavior    Morphine Other (See Comments)     Other reaction(s): Hallucinations  hallucinations    Statins Swelling     Facial swelling - PT is unsure if this is a true allergy    Other Other (See Comments)     sneezing    Aspartame And Phenylalanine Other (See Comments)     Headache    Phenylalanine Other (See Comments)     Headache  - DAIRY BASED PROTEIN     Family History   Problem Relation Age of Onset    Heart Disease Mother     High Blood Pressure Mother     Heart Disease Maternal Grandfather     Stroke Maternal Grandfather       Social History     Socioeconomic History    Marital status: Single     Spouse name: Not on file    Number of children: Not on file    Years of education: Not on file    Highest education level: Not on file   Occupational History    Occupation:    Tobacco Use    Smoking status: Current Every Day Smoker     Packs/day: 0.50     Years: 45.00     Pack years: 22.50     Types: Cigarettes    Smokeless tobacco: Never Used   Vaping Use    Vaping Use: Some days    Substances: Nicotine   Substance and Sexual Activity    Alcohol use: No     Alcohol/week: 0.0 standard drinks    Drug use: No    Sexual activity: Not on file   Other Topics Concern    Not on file   Social History Narrative    Raising 3 of her 9 grandchildren     Social Determinants of Health     Financial Resource Strain:     Difficulty of Paying Living Expenses: Not on file   Food Insecurity:     Worried About Running Out of Food in the Last Year: Not on file    Tee of Food in the Last Year: Not on file   Transportation Needs:     Lack of Transportation (Medical): Not on file    Lack of Transportation (Non-Medical):  Not on file   Physical Activity:     Days of Exercise per Week: Not on file    Minutes of Exercise per Session: Not on file   Stress:     Feeling of Stress : Not on file   Social Connections:     Frequency of Communication with Friends and Family: Not on file    Frequency of Social Gatherings with Friends and Family: Not on file    Attends Jewish Services: Not on file    Active Member of Clubs or Organizations: Not on file    Attends Club or Organization Meetings: Not on file    Marital Status: Not on file   Intimate Partner Violence:     Fear of Current or Ex-Partner: Not on file  Emotionally Abused: Not on file    Physically Abused: Not on file    Sexually Abused: Not on file   Housing Stability:     Unable to Pay for Housing in the Last Year: Not on file    Number of Places Lived in the Last Year: Not on file    Unstable Housing in the Last Year: Not on file      There were no vitals taken for this visit.      HEENT [] Hearing Loss  [] Visual Disturbance  [] Tinnitus  [] Eye pain   Respiratory [] Shortness of Breath  [] Cough  [] Snoring   Cardiovascular [] Chest Pain  [] Palpitations  [] Lightheaded   GI [] Constipation  [] Diarrhea  [] Swallowing change  [x] Nausea/vomiting    [] Urinary Frequency  [] Urinary Urgency   Musculoskeletal [] Neck pain  [] Back pain  [] Muscle pain  [] Restless legs   Dermatologic [] Skin changes   Neurologic [] Memory loss/confusion  [] Seizures  [] Trouble walking or imbalance  [] Dizziness  [] Sleep disturbance  [] Weakness  [] Numbness  [] Tremors  [] Speech Difficulty  [] Headaches  [] Light Sensitivity  [] Sound Sensitivity   Endocrinology []Excessive thirst  []Excessive hunger   Psychiatric [] Anxiety/Depression  [] Hallucination   Allergy/immunology []Hives/environmental allergies   Hematologic/lymph [] Abnormal bleeding  [] Abnormal bruising     General examination:    Head: Normocephalic, atraumatic  Eyes: Extraocular movements intact  Lungs: Respirations unlabored, chest wall no deformity  ENT: Normal external ear canals, no sinus tenderness  Heart: Regular rate rhythm  Abdomen: No masses, tenderness  Extremities: No cyanosis or edema, 2+ pulses  Skin: Intact, normal skin color    Neurological examination:    Mental status   Alert and oriented; intact memory with no confusion, speech or language problems; no hallucinations or delusions     Cranial nerves   II - visual fields intact to confrontation                                                III, IV, VI - extra-ocular muscles full: no pupillary defect; no CAITLYN, no nystagmus, no ptosis V - normal facial sensation                                                               VII - normal facial symmetry                                                             VIII - intact hearing                                                                             IX, X - symmetrical palate                                                                  XI - symmetrical shoulder shrug                                                       XII - midline tongue without atrophy or fasciculation     Motor function  Normal muscle bulk and tone; normal power 5/5, including fine motor movements     Sensory function Intact to touch, pin, vibration, proprioception     Cerebellar Intact fine motor movement. No involuntary movements or tremors     Reflex function Intact 2+ DTR and symmetric. Negative Babinski     Gait                  Normal station and gait       Assessment Recommendations:  Chronic medically intractable headache    Patient admits to breakthrough headaches approximately 2-3x per week. She receives regular Botox injections, which reportedly help, and takes Imitrex as for headache rescue. I recommend the patient try Ubrelvy 50mg PRN for headache rescue in place of Imitrex and continue Topamax 50mg in the mornings and 100mg in the evenings plus regularly scheduled Botox injections for headache prophylaxis. Patient recently has tried and failed multiple epidural including Imitrex and Maxalt    Patient to follow up as scheduled for Botox appointment. Patient may call office if current symptoms worsen or new side effects are observed. Scribe Attestation:   By signing my name below, Kranthi Armas, attest that this documentation has been prepared under the direction and in the presence of Gio Charles MD.    Electronically Signed: Dermott Baseman [address]. 04/14/22. 8:37 AM    Physician Attestation:  Omid Peña MD, personally performed the services described in this documentation.  All medical record entries made by the scribe were at my direction and in my presence. I have reviewed the chart and discharge instructions (if applicable) and agree that the record reflects my personal performance and is accurate and complete.     Electronically Signed: Sabi Murrell 4/14/2022 8:37 AM    Diplomate, American Board of Psychiatry and Neurology  Diplomate, American Board of Clinical Neurophysiology  Diplomate, American Board of Epilepsy

## 2022-04-26 ENCOUNTER — TELEPHONE (OUTPATIENT)
Dept: NEUROLOGY | Age: 62
End: 2022-04-26

## 2022-07-11 RX ORDER — TOPIRAMATE 50 MG/1
TABLET, FILM COATED ORAL
Qty: 270 TABLET | Refills: 0 | Status: SHIPPED | OUTPATIENT
Start: 2022-07-11 | End: 2022-10-11

## 2022-07-11 NOTE — TELEPHONE ENCOUNTER
*Please send for Dr. Carson Cross requesting refill of Topamax 50 mg.      Medication active on med list yes      Date of last Rx: 3/1/2022 with 0 refills          verified by SB, RMA      Date of last appointment 4/14/2022    Next Visit Date:  8/16/2022

## 2022-08-16 ENCOUNTER — PROCEDURE VISIT (OUTPATIENT)
Dept: NEUROLOGY | Age: 62
End: 2022-08-16
Payer: MEDICARE

## 2022-08-16 DIAGNOSIS — G43.009 MIGRAINE WITHOUT AURA AND WITHOUT STATUS MIGRAINOSUS, NOT INTRACTABLE: Primary | ICD-10-CM

## 2022-08-16 PROCEDURE — 64615 CHEMODENERV MUSC MIGRAINE: CPT | Performed by: PSYCHIATRY & NEUROLOGY

## 2022-08-30 NOTE — PROGRESS NOTES
[facility]  [facility], [address], [address] [place] [address]  Ph: [phone] or [phone]  FAX: [phone]    Marina Cardenas M.D.  ANA Hou M.D. Shaheed Rea M.D. Indication for treatment: Chronic migraine without aura, intractable, with status migrainosus (G 43.231)  Consent form was signed. Please see the associated scanned paper. Potential risks and benefits for the procedure were explained to the patient. Procedure: 30-gauge half inch needle was used and 200 U vial Botox was prepared with 4ml 0.9% NS.155 units of Botox were used and 45 units of Botox were discarded.    Botox was injected at 31 different sites as follows:   Order  Muscle  Units injected    A  Corrugator1  10 units at 2 div sites    B  Procerus  5 Units in 1 site    C  Frontalis¹  20 Units div. 4 sites    D  Temporalis¹  40 Units div 8 site    E  Occipitalis¹  30 Units div. 6 sites    F  Cervical paraspinal muscles¹  20 Units div 4 sites    G  Trapezius¹  30 Units div 6 sites    Total Dose  155 Units div 31 sites    2 Dose distributed bilaterally  Blood loss: Less than 1 cc  Complications: none

## 2022-09-19 RX ORDER — RIZATRIPTAN BENZOATE 5 MG/1
TABLET, ORALLY DISINTEGRATING ORAL
Qty: 10 TABLET | Refills: 3 | OUTPATIENT
Start: 2022-09-19

## 2022-09-20 RX ORDER — RIZATRIPTAN BENZOATE 5 MG/1
TABLET, ORALLY DISINTEGRATING ORAL
Qty: 10 TABLET | Refills: 3 | Status: SHIPPED | OUTPATIENT
Start: 2022-09-20

## 2022-09-20 NOTE — TELEPHONE ENCOUNTER
Patient calling for refill of Maxalt.       Medication active on med list yes      Date of last fill: 2/18/21  verified on 9/20/2022   verified by [facility] LPN      Date of last appointment 8/19/22    Next Visit Date:  11/15/2022

## 2022-10-11 RX ORDER — TOPIRAMATE 50 MG/1
TABLET, FILM COATED ORAL
Qty: 270 TABLET | Refills: 0 | Status: SHIPPED | OUTPATIENT
Start: 2022-10-11

## 2022-10-11 NOTE — TELEPHONE ENCOUNTER
Pharmacy requesting refill of Topamax.       Medication active on med list yes      Date of last fill: 7/11/22  verified on 10/11/2022   verified by [facility] LPN      Date of last appointment 4/14/22    Next Visit Date:  11/15/22

## 2022-11-15 ENCOUNTER — TELEMEDICINE (OUTPATIENT)
Dept: NEUROLOGY | Age: 62
End: 2022-11-15
Payer: MEDICARE

## 2022-11-15 DIAGNOSIS — G43.711 CHRONIC MIGRAINE WITHOUT AURA, WITH INTRACTABLE MIGRAINE, SO STATED, WITH STATUS MIGRAINOSUS: ICD-10-CM

## 2022-11-15 DIAGNOSIS — G43.009 MIGRAINE WITHOUT AURA AND WITHOUT STATUS MIGRAINOSUS, NOT INTRACTABLE: Primary | ICD-10-CM

## 2022-11-15 PROCEDURE — 4004F PT TOBACCO SCREEN RCVD TLK: CPT | Performed by: PSYCHIATRY & NEUROLOGY

## 2022-11-15 PROCEDURE — G8484 FLU IMMUNIZE NO ADMIN: HCPCS | Performed by: PSYCHIATRY & NEUROLOGY

## 2022-11-15 PROCEDURE — 3017F COLORECTAL CA SCREEN DOC REV: CPT | Performed by: PSYCHIATRY & NEUROLOGY

## 2022-11-15 PROCEDURE — 99214 OFFICE O/P EST MOD 30 MIN: CPT | Performed by: PSYCHIATRY & NEUROLOGY

## 2022-11-15 PROCEDURE — G8427 DOCREV CUR MEDS BY ELIG CLIN: HCPCS | Performed by: PSYCHIATRY & NEUROLOGY

## 2022-11-15 PROCEDURE — G8421 BMI NOT CALCULATED: HCPCS | Performed by: PSYCHIATRY & NEUROLOGY

## 2022-11-15 RX ORDER — RIMEGEPANT SULFATE 75 MG/75MG
75 TABLET, ORALLY DISINTEGRATING ORAL PRN
Qty: 30 TABLET | Refills: 2 | Status: SHIPPED | OUTPATIENT
Start: 2022-11-15 | End: 2022-11-17

## 2022-11-15 NOTE — PROGRESS NOTES
[facility], [address], [address] [facility]  Ph: [phone] or [phone]  FAX: [phone]    This is a telehealth visit    Chief Complaint: Migraines      Dear JOSEMANUEL Melendez - CNP     I had the pleasure of seeing your patient today in neurology consultation for her symptoms. As you would recall Meggan Petersen is a 63 y.o. female. She previously visited the clinic for her chronic migraine headaches, medically intractable with status cyanosis. She has been headache free since the Botox injection except for one headache. Currently, she is taking Lyrica 100 mg twice a day and topiramate 50 mg three times daily. She reports medication compliance. Patient previously reported she moved to [place] for approximately one year during which she was not receiving Botox injections. Botox was re-initiated upon her return to [place] as she admitted to relief. During a previous visit, she approximated 6 headaches a month. Patient takes Fioricet for these episodes with good relief. When Fioricet is not effective, patient uses Maxalt with success. Last visit 9/9/21, patient reports medication compliance with topiramate 50 mg TID. Patient has noticed decreased in occurrence of headaches. She also reports nauseous sensation when eating. States she feel she need to produce emesis despite not eating well. Patient reports she has tried to eat multiple small portions of food but continues to feel nauseous. Patient presented for a follow up on 4/14/22. Patient receives regular Botox injections and denies side-effects. Patient states her headaches are typically well controlled with Botox. She admits to some breakthrough headaches, for which she treated with Fioricet 2-3x per week. Patient also takes Topamax 50 mg in the mornings and 100 mg in the evenings. Patient also takes Imitrex injections if the pain is a severity level 5 or more out of 10.  Patient takes Maxalt dissolvables to treat abdominal migraines. The patient is presenting for a follow-up on 11/15/2022. The patient's last Botox injection was on 8/16/2022. She reports that she has been doing good overall. She states that she experiences 3 headaches a month and uses Maxalt for relief. She also states that Fioricet helps her more with headaches and Maxalt helps with her abdominal migraines.     Current prophylactic medication Dosage   Botox injections    Topamax 50 mg         Previous prophylactic medications Reason for discontinuation                             Previous Abortive medications Reason for discontinuation   Imitrex    Maxalt                        Past Medical History:   Diagnosis Date    Anxiety and depression     Asthma     Back injury     Cataracts, bilateral     COPD (chronic obstructive pulmonary disease) ([facility] [address] 8/13/2015    Gastroesophageal reflux disease without esophagitis 8/13/2015    H/O blood clots     Headache(784.0)     Heart attack ([facility] [address]     Hypertension     Knee injury     Osteoarthritis     Psoriasis     PTSD (post-traumatic stress disorder)      Past Surgical History:   Procedure Laterality Date    CERVIX SURGERY  1997/1998    ELBOW SURGERY      JOINT REPLACEMENT Right 10/28/2020    KNEE SURGERY      LEEP      NERVE BLOCK  2018     Allergies   Allergen Reactions    Aspartame      Other reaction(s): Headache    Benadryl [Diphenhydramine] Other (See Comments)     Shaking and Restless Legs  IV benadryl causes tremor  Shaking and Restless Legs    Celebrex [Celecoxib] Other (See Comments)     Other reaction(s): GI Disturbance  Upset Stomach, Nausea, Vomiting  Upset Stomach, Nausea, Vomiting    Codeine Other (See Comments)     Sensitive, get \"loopy\"    Demerol Hcl [Meperidine] Other (See Comments)     Other reaction(s): Hallucinations  Makes the patient feel \"out of in\"   Makes the patient feel \"out of in\"     Gabapentin Other (See Comments)     Other reaction(s): Abnormal Behavior  Violent behavior    Morphine Other (See Comments)     Other reaction(s): Hallucinations  hallucinations    Statins Swelling     Facial swelling - PT is unsure if this is a true allergy    Other Other (See Comments)     sneezing    Aspartame And Phenylalanine Other (See Comments)     Headache    Phenylalanine Other (See Comments)     Headache  - DAIRY BASED PROTEIN     Family History   Problem Relation Age of Onset    Heart Disease Mother     High Blood Pressure Mother     Heart Disease Maternal Grandfather     Stroke Maternal Grandfather       Social History     Socioeconomic History    Marital status: Single     Spouse name: Not on file    Number of children: Not on file    Years of education: Not on file    Highest education level: Not on file   Occupational History    Occupation:    Tobacco Use    Smoking status: Every Day     Packs/day: 0.50     Years: 45.00     Pack years: 22.50     Types: Cigarettes    Smokeless tobacco: Never   Vaping Use    Vaping Use: Some days    Substances: Nicotine   Substance and Sexual Activity    Alcohol use: No     Alcohol/week: 0.0 standard drinks    Drug use: No    Sexual activity: Not on file   Other Topics Concern    Not on file   Social History Narrative    Raising 3 of her 9 grandchildren     Social Determinants of Health     Financial Resource Strain: Not on file   Food Insecurity: Not on file   Transportation Needs: Not on file   Physical Activity: Not on file   Stress: Not on file   Social Connections: Not on file   Intimate Partner Violence: Not on file   Housing Stability: Not on file      There were no vitals taken for this visit.      HEENT [] Hearing Loss  [] Visual Disturbance  [] Tinnitus  [] Eye pain   Respiratory [] Shortness of Breath  [] Cough  [] Snoring   Cardiovascular [] Chest Pain  [] Palpitations  [] Lightheaded   GI [] Constipation  [] Diarrhea  [] Swallowing change  [x] Nausea/vomiting    [] Urinary Frequency  [] Urinary Urgency   Musculoskeletal [] Neck pain  [] Back pain  [] Muscle pain  [] Restless legs   Dermatologic [] Skin changes   Neurologic [] Memory loss/confusion  [] Seizures  [] Trouble walking or imbalance  [] Dizziness  [] Sleep disturbance  [] Weakness  [] Numbness  [] Tremors  [] Speech Difficulty  [x] Headaches  [] Light Sensitivity  [] Sound Sensitivity   Endocrinology []Excessive thirst  []Excessive hunger   Psychiatric [] Anxiety/Depression  [] Hallucination   Allergy/immunology []Hives/environmental allergies   Hematologic/lymph [] Abnormal bleeding  [] Abnormal bruising     General examination:    Head: Normocephalic, atraumatic  Eyes: Extraocular movements intact  Lungs: Respirations unlabored, chest wall no deformity  ENT: Normal external ear canals, no sinus tenderness  Heart: Regular rate rhythm  Abdomen: No masses, tenderness  Extremities: No cyanosis or edema, 2+ pulses  Skin: Intact, normal skin color    Neurological examination:    Mental status   Alert and oriented; intact memory with no confusion, speech or language problems; no hallucinations or delusions     Cranial nerves   II - visual fields intact to confrontation                                                III, IV, VI - extra-ocular muscles full: no pupillary defect; no CAITLYN, no nystagmus, no ptosis   V - normal facial sensation                                                               VII - normal facial symmetry                                                             VIII - intact hearing                                                                             IX, X - symmetrical palate                                                                  XI - symmetrical shoulder shrug                                                       XII - midline tongue without atrophy or fasciculation     Motor function  Normal muscle bulk and tone; normal power 5/5, including fine motor movements     Sensory function Intact to touch, pin, vibration, proprioception Cerebellar Intact fine motor movement. No involuntary movements or tremors     Reflex function Unable to assess     Gait                  Normal station and gait       Assessment Recommendations:  Chronic medically intractable headache    The patient has been receiving regular Botox injections and her previous injection was on 2022. She reports relief but reports about 2-3 breakthrough headaches a month. She has previously taken Maxalt and Imitrex for headache  but did not find relief. The patient has failed at least 2 triptan medications, Maxalt and Imitrex. In my opinion, the patient would be a good candidate for Nurtec 75 mg PRN for her migraines. The patient meets the diagnosis for chronic migraine without aura, intractable, with status migrainosus (G43.711)    All medication side effects were discussed and questions answered. Patient to follow up as scheduled for Botox appointment. Scribe Attestation:   By signing my name below, I, Teodoro Barillas, attest that this documentation has been prepared under the direction and in the presence of Cherelle Hodges MD.    Electronically Signed: Teodoro Barillas. 11/15/22. 9:06 AM    This is a telehealth visit that was performed with the originating site at Patient Location: Patient Home and Provider Location of [place], [place]. Verbal consent to participate in video visit was obtained. Pursuant to the emergency declaration under the [facility], [address] waiver authority and the [employer] General Act, this Virtual Visit was conducted, with patient's consent, to reduce the patient's risk of exposure to COVID-19 and provide continuity of care for an established/new patient. Services were provided through a video synchronous discussion virtually to substitute for in-person clinic visit.  I discussed with the patient the nature of our telehealth visits via interactive/real-time audio/video that:  - I would evaluate the patient and recommend diagnostics and treatments based on my assessment  - Our sessions are not being recorded and that personal health information is protected  - Our team would provide follow up care in person if/when the patient needs it.

## 2022-11-15 NOTE — TELEPHONE ENCOUNTER
Department of Orthopedic Surgery  Attending Consult Note        Reason for Consult: Right knee effusion  Requesting Physician: Guevara Manriquez MD    CHIEF COMPLAINT: Atraumatic effusion of right knee    History Obtained From:  patient and electronic medical record    HISTORY OF PRESENT ILLNESS:                The patient is a 52 y.o. male, history of human immunodeficiency virus and gout with relatively recent medication changes, who has developed polyarticular swelling, most pronounced in the right knee. Notes that he developed swelling along the lateral aspect of the left foot proximally 1 week ago which subsequently migrated to left knee. The symptoms dissipated to some extent but developed right foot and right knee swelling. Presented to emergency department with jeffrey effusion of the right knee without precipitating trauma. I discussed findings with the emergency room attending, Dr. Parminder Kamara, who performed therapeutic aspiration with liberation of 90 cc of serous fluid, no purulence or sanguinous contents as per the patient and the emergency room attending. Fluid was not sent for cell count or differential and no cultures were obtained. Patient notes that he has been afebrile with no fevers or chills. Right knee pain has improved significantly following aspiration. Currently rates pain 5/10, described as sharp, aggravated with ambulation. Has been admitted for physical therapy evaluation in anticipation of discharge home within the next 1 to 2 days. Past Medical History:        Diagnosis Date    Anxiety     Gout     HIV (human immunodeficiency virus infection) ([facility] [address]      Past Surgical History:    History reviewed. No pertinent surgical history.   Current Medications:   Current Facility-Administered Medications: colchicine (COLCRYS) tablet 1.2 mg, 1.2 mg, Oral, Once  sodium chloride flush 0.9 % injection 5-40 mL, 5-40 mL, IntraVENous, 2 times per day  sodium chloride flush 0.9 % injection 5-40 mL, 5-40 New Nurtec prescription requested from pharmacy because the tablet count is incorrect and the signature line needs to state daily prn. New order added. mL, IntraVENous, PRN  0.9 % sodium chloride infusion, , IntraVENous, PRN  ondansetron (ZOFRAN-ODT) disintegrating tablet 4 mg, 4 mg, Oral, Q8H PRN **OR** ondansetron (ZOFRAN) injection 4 mg, 4 mg, IntraVENous, Q6H PRN  polyethylene glycol (GLYCOLAX) packet 17 g, 17 g, Oral, Daily PRN  acetaminophen (TYLENOL) tablet 650 mg, 650 mg, Oral, Q6H PRN **OR** acetaminophen (TYLENOL) suppository 650 mg, 650 mg, Rectal, Q6H PRN  furosemide (LASIX) injection 40 mg, 40 mg, IntraVENous, BID  oxyCODONE-acetaminophen (PERCOCET) 5-325 MG per tablet 1 tablet, 1 tablet, Oral, Q4H PRN  meloxicam (MOBIC) tablet 7.5 mg, 7.5 mg, Oral, Daily  Allergies:  Bactrim [sulfamethoxazole-trimethoprim]    Social History:   TOBACCO:   reports that he has never smoked. He has never used smokeless tobacco.  Family History:   History reviewed. No pertinent family history. REVIEW OF SYSTEMS:    CONSTITUTIONAL: Negative for fevers, chills, weight loss  EYES: Wears glasses. Denies double vision or eye pain. HEENT: Denies headache or sinus pain. No sore throat. RESPIRATORY: Denies wheezing, dyspnea, stridor  CARDIOVASCULAR: Denies chest pain or palpitations. GASTROINTESTINAL: Denies abdominal pain, no nausea or vomiting. GENITOURINARY: Negative for urinary frequency or hematuria. INTEGUMENT/BREAST: Negative for erythema or skin lesions involving the affected joints. ALLERGIC/IMMUNOLOGIC: Positive for HIV, no environmental allergies. Allergic to Bactrim. ENDOCRINE: No heat or cold intolerance. MUSCULOSKELETAL: Positive for bilateral knee pain and bilateral ankle pain, most pronounced in the right knee. NEUROLOGICAL: Negative for numbness or tingling. BEHAVIOR/PSYCH: Negative for increased anxiety or depressive symptoms.     PHYSICAL EXAM:    VITALS:  BP (!) 121/58   Pulse 86   Temp 98.1 °F (36.7 °C) (Oral)   Resp 18   Ht 5' 5\" (1.651 m)   Wt 254 lb (115.2 kg)   SpO2 98%   BMI 42.27 kg/m²   24HR INTAKE/OUTPUT:    Intake/Output Summary (Last 24 hours) at 7/12/2022 1825  Last data filed at 7/12/2022 1730  Gross per 24 hour   Intake 600 ml   Output --   Net 600 ml     CONSTITUTIONAL: Alert, oriented x3, cooperative with examination, minimal distress. EYES: EOMI, no nystagmus. ENT: Oral mucosa moist, tongue midline. NECK: No JVD, no cervical masses  HEMATOLOGIC/LYMPHATICS: No visible bruising of the knees bilaterally or ankles bilaterally. LUNGS: Equal and symmetrical inspiratory and expiratory effort, no stridor. ABDOMEN: Abdomen obese, nontender to palpation, nondistended. MUSCULOSKELETAL:    Left lower extremity with minimal discomfort on knee range of motion and ankle range of motion, no obvious swelling of left knee. Right lower extremity with right ankle and compression wrap. Right knee demonstrates trace swelling of suprapatellar pouch with no residual effusion. Knee is stable to varus valgus stress, both in full extension and at 30 degrees of knee flexion. Able to initiate and maintain a straight leg raise without extensor lag. EHL, plantarflexion, dorsiflexion, knee flexion, knee extension are 4/5 limited by apprehension and discomfort. Normal posterior drawer and Lachman's test.  Negative Nataliya test.  NEUROLOGIC: Sensory intact light touch in deep peroneal, superficial peroneal, saphenous, tibial, sural nerve distributions bilaterally. SKIN: Normal skin turgor and texture.     DATA:    CBC:   Lab Results   Component Value Date/Time    WBC 6.4 07/12/2022 10:40 AM    RBC 4.23 07/12/2022 10:40 AM    HGB 13.0 07/12/2022 10:40 AM    HCT 40.3 07/12/2022 10:40 AM    MCV 95.3 07/12/2022 10:40 AM    MCH 30.7 07/12/2022 10:40 AM    MCHC 32.3 07/12/2022 10:40 AM    RDW 13.5 07/12/2022 10:40 AM     07/12/2022 10:40 AM     BMP:    Lab Results   Component Value Date/Time     07/12/2022 10:40 AM    K 4.0 07/12/2022 10:40 AM     07/12/2022 10:40 AM    CO2 22 07/12/2022 10:40 AM    BUN 11 07/12/2022 10:40 AM    SCOTTY 3.2 07/12/2022 10:40 AM    CREATININE 0.78 07/12/2022 10:40 AM    CALCIUM 9.4 07/12/2022 10:40 AM    GFRAA >60.0 07/12/2022 10:40 AM    LABGLOM >60.0 07/12/2022 10:40 AM    GLUCOSE 94 07/12/2022 10:40 AM     Radiology Review: I have independently reviewed radiographs of the right knee obtained at [facility] and archived through [facility] on 7/12. Performed prior to diagnostic aspiration. Confirm presence of large effusion. Maintenance of medial, lateral, patellofemoral articulations. Normal Insall-Salvati ratio. No soft tissue calcifications or chondrocalcinosis. .    IMPRESSION/RECOMMENDATIONS:    -Atraumatic effusion of right knee with elements of bilateral knee synovitis and bilateral ankle swelling, considerations to include reactive synovitis in the setting of recent medication changes as well as potential polyarticular flare of gout. Findings were discussed with the patient as well as options moving forward. Given reports of clear fluid by the patient and, as suspicion for infection is low. Furthermore his clinical examination is relatively benign despite discomfort with range of motion. Have discussed options for structured conservative management and would defer discussion of any corticosteroid injection until patient's clinical status has stabilized. Would recommend use of a static knee immobilizer, weightbearing as tolerated, no pivoting on right lower extremity while standing or walking. Would also incorporate compressive Ace wrap. May ice for 15 to 20 minutes every 4 hours while awake with knee immobilizer removed. PT/OT assessment noted. Would anticipate the patient will be appropriate for discharge home in 1 to 2 days, once inflammation has subsided. We will initiate daily meloxicam and patient is also initiated course of colchicine. Recommend follow-up with primary care physician to discuss potential role for maintenance regimen for treatment of gout.   Also recommend follow-up in my office in 2 weeks for clinical reassessment and to determine if there is any role for palliative steroid injection to diminish synovitis or advanced imaging should mechanical symptoms become more pronounced. Patient is appropriate for discharge from an orthopedic standpoint. Thank you for the opportunity to participate in the care of this pleasant gentleman.       Colin Sousa MD  [facility]

## 2022-11-16 ENCOUNTER — PROCEDURE VISIT (OUTPATIENT)
Dept: NEUROLOGY | Age: 62
End: 2022-11-16
Payer: MEDICARE

## 2022-11-16 DIAGNOSIS — G43.711 CHRONIC MIGRAINE WITHOUT AURA, WITH INTRACTABLE MIGRAINE, SO STATED, WITH STATUS MIGRAINOSUS: ICD-10-CM

## 2022-11-16 DIAGNOSIS — G43.009 MIGRAINE WITHOUT AURA AND WITHOUT STATUS MIGRAINOSUS, NOT INTRACTABLE: Primary | ICD-10-CM

## 2022-11-16 PROCEDURE — 64615 CHEMODENERV MUSC MIGRAINE: CPT | Performed by: PSYCHIATRY & NEUROLOGY

## 2022-11-16 RX ORDER — RIMEGEPANT SULFATE 75 MG/75MG
75 TABLET, ORALLY DISINTEGRATING ORAL DAILY PRN
Qty: 8 TABLET | Refills: 2 | OUTPATIENT
Start: 2022-11-16 | End: 2022-12-16

## 2022-11-17 RX ORDER — RIMEGEPANT SULFATE 75 MG/75MG
75 TABLET, ORALLY DISINTEGRATING ORAL PRN
Qty: 8 TABLET | Refills: 2 | Status: SHIPPED | OUTPATIENT
Start: 2022-11-17 | End: 2022-12-17

## 2023-01-09 NOTE — TELEPHONE ENCOUNTER
Pharmacy requesting refill of topiramate (TOPAMAX) 50 MG tablet      Medication active on med list yes      Date of last Rx: 10/11/2022 with 0 refills          verified by AALIYAH WILLIS      Date of last appointment 11/15/2022    Next Visit Date:  2/14/2023

## 2023-01-10 RX ORDER — TOPIRAMATE 50 MG/1
TABLET, FILM COATED ORAL
Qty: 270 TABLET | Refills: 0 | Status: SHIPPED | OUTPATIENT
Start: 2023-01-10

## 2023-02-14 ENCOUNTER — OFFICE VISIT (OUTPATIENT)
Dept: NEUROLOGY | Age: 63
End: 2023-02-14

## 2023-02-14 DIAGNOSIS — G43.009 MIGRAINE WITHOUT AURA AND WITHOUT STATUS MIGRAINOSUS, NOT INTRACTABLE: Primary | ICD-10-CM

## 2023-02-14 DIAGNOSIS — G43.711 CHRONIC MIGRAINE WITHOUT AURA, WITH INTRACTABLE MIGRAINE, SO STATED, WITH STATUS MIGRAINOSUS: ICD-10-CM

## 2023-02-27 NOTE — PROGRESS NOTES
[facility]  [facility], [address], [address] [place] [address]  Ph: [phone] or [phone]  FAX: [phone]    Mayra Delgado M.D.  Michael Montero M.D. Yoly Tuttle M.D. Mary Lou Carcamo M.D. Indication for treatment: Chronic migraine without aura, intractable, with status migrainosus (G 43.881)  Consent form was signed. Please see the associated scanned paper. Potential risks and benefits for the procedure were explained to the patient. Procedure: 30-gauge half inch needle was used and 200 U vial Botox was prepared with 4ml 0.9% NS.155 units of Botox were used and 45 units of Botox were discarded.    Botox was injected at 31 different sites as follows:   Order  Muscle  Units injected    A  Corrugator1  10 units at 2 div sites    B  Procerus  5 Units in 1 site    C  Frontalis¹  20 Units div. 4 sites    D  Temporalis¹  40 Units div 8 site    E  Occipitalis¹  30 Units div. 6 sites    F  Cervical paraspinal muscles¹  20 Units div 4 sites    G  Trapezius¹  30 Units div 6 sites    Total Dose  155 Units div 31 sites    2 Dose distributed bilaterally  Blood loss: Less than 1 cc  Complications: none

## 2023-04-06 RX ORDER — TOPIRAMATE 50 MG/1
TABLET, FILM COATED ORAL
Qty: 270 TABLET | Refills: 0 | Status: SHIPPED | OUTPATIENT
Start: 2023-04-06

## 2023-04-06 NOTE — TELEPHONE ENCOUNTER
Pharmacy requesting refill of topiramate (TOPAMAX) 50 MG tablet      Medication active on med list yes      Date of last Rx: 1/10/2023 with 0 refills          verified by AALIYAH WILLIS      Date of last appointment 11/15/2023    Next Visit Date:  5/2/2023

## 2023-04-14 DIAGNOSIS — G43.009 MIGRAINE WITHOUT AURA AND WITHOUT STATUS MIGRAINOSUS, NOT INTRACTABLE: ICD-10-CM

## 2023-04-14 DIAGNOSIS — G43.711 CHRONIC MIGRAINE WITHOUT AURA, WITH INTRACTABLE MIGRAINE, SO STATED, WITH STATUS MIGRAINOSUS: ICD-10-CM

## 2023-04-17 RX ORDER — RIMEGEPANT SULFATE 75 MG/75MG
TABLET, ORALLY DISINTEGRATING ORAL
Qty: 8 TABLET | Refills: 2 | Status: SHIPPED | OUTPATIENT
Start: 2023-04-17

## 2023-04-17 NOTE — TELEPHONE ENCOUNTER
Pharmacy requesting refill of Rimegepant Sulfate (NURTEC) 75 MG TBDP      Medication active on med list yes      Date of last Rx: 11/17/2022 with 2 refills          verified by AALIYAH WILLIS      Date of last appointment 11/15/2022    Next Visit Date:  5/2/2023

## 2023-05-02 ENCOUNTER — TELEMEDICINE (OUTPATIENT)
Dept: NEUROLOGY | Age: 63
End: 2023-05-02
Payer: MEDICAID

## 2023-05-02 DIAGNOSIS — G43.009 MIGRAINE WITHOUT AURA AND WITHOUT STATUS MIGRAINOSUS, NOT INTRACTABLE: Primary | ICD-10-CM

## 2023-05-02 PROCEDURE — 99214 OFFICE O/P EST MOD 30 MIN: CPT | Performed by: PSYCHIATRY & NEUROLOGY

## 2023-05-02 NOTE — PROGRESS NOTES
or fasciculation     Motor function  Normal muscle bulk and tone; normal power 5/5, including fine motor movements     Sensory function Intact to touch, pin, vibration, proprioception     Cerebellar Intact fine motor movement. No involuntary movements or tremors     Reflex function Unable to assess     Gait                  Normal station and gait       Assessment Recommendations:  Chronic medically intractable headache    The patient receives Botox injections and had her last injection on 02/14/23 which has provided relief. Since the last Botox procedure, the patient denies any breakthrough headaches. If she does experience any breakthrough headaches, I advise the patient to take Nurtec 75 mg, as needed for breakthrough headache as previously stated. All medication side effects were discussed and questions answered. Patient to follow up as scheduled for Botox appointment. Scribe Attestation:   By signing my name below, I, Karen Aden, attest that this documentation has been prepared under the direction and in the presence of Liang Alavrado MD.    Electronically Signed: Mayco De Leon. 05/02/23 9:15 AM     This is a telehealth visit that was performed with the originating site at Patient Location: Patient Home and Provider Location of [place], [place]. Verbal consent to participate in video visit was obtained. Pursuant to the emergency declaration under the [facility], [address]5 waiver authority and the [employer] General Act, this Virtual Visit was conducted, with patient's consent, to reduce the patient's risk of exposure to COVID-19 and provide continuity of care for an established/new patient. Services were provided through a video synchronous discussion virtually to substitute for in-person clinic visit.  I discussed with the patient the nature of our telehealth visits via interactive/real-time audio/video that:  - I would

## 2023-05-10 ENCOUNTER — TELEPHONE (OUTPATIENT)
Dept: NEUROLOGY | Age: 63
End: 2023-05-10

## 2023-05-17 ENCOUNTER — OFFICE VISIT (OUTPATIENT)
Dept: NEUROLOGY | Age: 63
End: 2023-05-17
Payer: MEDICAID

## 2023-05-17 DIAGNOSIS — G43.711 CHRONIC MIGRAINE WITHOUT AURA, WITH INTRACTABLE MIGRAINE, SO STATED, WITH STATUS MIGRAINOSUS: ICD-10-CM

## 2023-05-17 DIAGNOSIS — G43.009 MIGRAINE WITHOUT AURA AND WITHOUT STATUS MIGRAINOSUS, NOT INTRACTABLE: Primary | ICD-10-CM

## 2023-05-17 PROCEDURE — 64615 CHEMODENERV MUSC MIGRAINE: CPT | Performed by: PSYCHIATRY & NEUROLOGY

## 2023-05-17 NOTE — PROGRESS NOTES
[facility]  [facility], [address], [address] [place] [address]  Ph: [phone] or [phone]  FAX: [phone]    Calvin Howard M.D.  ANA Nevarez M.D. Umberto Downer, M.D. Indication for treatment: Chronic migraine without aura, intractable, with status migrainosus (G 43.091)  Consent form was signed. Please see the associated scanned paper. Potential risks and benefits for the procedure were explained to the patient. Procedure: 30-gauge half inch needle was used and 200 U vial Botox was prepared with 4ml 0.9% NS.155 units of Botox were used and 45 units of Botox were discarded.    Botox was injected at 31 different sites as follows:   Order  Muscle  Units injected    A  Corrugator1  10 units at 2 div sites    B  Procerus  5 Units in 1 site    C  Frontalis¹  20 Units div. 4 sites    D  Temporalis¹  40 Units div 8 site    E  Occipitalis¹  30 Units div. 6 sites    F  Cervical paraspinal muscles¹  20 Units div 4 sites    G  Trapezius¹  30 Units div 6 sites    Total Dose  155 Units div 31 sites    2 Dose distributed bilaterally  Blood loss: Less than 1 cc  Complications: none

## 2023-05-24 DIAGNOSIS — G43.711 CHRONIC MIGRAINE WITHOUT AURA, WITH INTRACTABLE MIGRAINE, SO STATED, WITH STATUS MIGRAINOSUS: ICD-10-CM

## 2023-05-24 DIAGNOSIS — G43.009 MIGRAINE WITHOUT AURA AND WITHOUT STATUS MIGRAINOSUS, NOT INTRACTABLE: ICD-10-CM

## 2023-05-24 RX ORDER — RIMEGEPANT SULFATE 75 MG/75MG
TABLET, ORALLY DISINTEGRATING ORAL
Qty: 8 TABLET | Refills: 2 | OUTPATIENT
Start: 2023-05-24

## 2023-06-29 RX ORDER — TOPIRAMATE 50 MG/1
TABLET, FILM COATED ORAL
Qty: 270 TABLET | Refills: 0 | Status: SHIPPED | OUTPATIENT
Start: 2023-06-29

## 2023-08-07 ENCOUNTER — TELEPHONE (OUTPATIENT)
Dept: NEUROLOGY | Age: 63
End: 2023-08-07

## 2023-08-07 NOTE — TELEPHONE ENCOUNTER
08 07 2023 I called the patient times 2 (07 28 2023 and 08 02 2023 at ) to schedule November of 2023 follow up appointment with Dr. Ary Cash both times, no response. I mailed the patient a letter asking them to call the office back to schedule this appointment. [place]   LMOM for patient that we are no longer in network with [facility] Medicaid.   [place]

## 2023-09-25 RX ORDER — TOPIRAMATE 50 MG/1
TABLET, FILM COATED ORAL
Qty: 270 TABLET | Refills: 0 | Status: SHIPPED | OUTPATIENT
Start: 2023-09-25

## 2023-09-25 NOTE — TELEPHONE ENCOUNTER
Pharmacy requesting refill of Topamax 50mg.       Medication active on med list yes      Date of last Rx: 6/29/2023 with 0 refills          verified by Roxi Stuart LPN      Date of last appointment 5/17/2023    Next Visit Date:  Visit date not found

## 2023-10-30 RX ORDER — TOPIRAMATE 50 MG/1
TABLET, FILM COATED ORAL
Qty: 270 TABLET | Refills: 1 | Status: SHIPPED | OUTPATIENT
Start: 2023-10-30

## 2023-10-30 NOTE — TELEPHONE ENCOUNTER
Patient called in requesting a refill because she has switched pharmacies. Pharmacy requesting refill of Topamax 50mg.       Medication active on med list yes      Date of last Rx: 9/25/2023 with 0 refills          verified by Laurita Huggins LPN      Date of last appointment 5/17/2023    Next Visit Date:  Visit date not found

## 2024-01-09 ENCOUNTER — OFFICE VISIT (OUTPATIENT)
Dept: PRIMARY CARE CLINIC | Age: 64
End: 2024-01-09
Payer: COMMERCIAL

## 2024-01-09 VITALS
DIASTOLIC BLOOD PRESSURE: 92 MMHG | HEIGHT: 64 IN | OXYGEN SATURATION: 97 % | BODY MASS INDEX: 30.11 KG/M2 | WEIGHT: 176.4 LBS | SYSTOLIC BLOOD PRESSURE: 176 MMHG | HEART RATE: 89 BPM

## 2024-01-09 DIAGNOSIS — Z23 NEED FOR INFLUENZA VACCINATION: ICD-10-CM

## 2024-01-09 DIAGNOSIS — N32.81 OVERACTIVE BLADDER: ICD-10-CM

## 2024-01-09 DIAGNOSIS — F41.9 ANXIETY AND DEPRESSION: ICD-10-CM

## 2024-01-09 DIAGNOSIS — I10 HYPERTENSION, UNSPECIFIED TYPE: ICD-10-CM

## 2024-01-09 DIAGNOSIS — Z72.0 TOBACCO ABUSE: ICD-10-CM

## 2024-01-09 DIAGNOSIS — F32.A ANXIETY AND DEPRESSION: ICD-10-CM

## 2024-01-09 DIAGNOSIS — J45.20 MILD INTERMITTENT ASTHMA, UNSPECIFIED WHETHER COMPLICATED: ICD-10-CM

## 2024-01-09 DIAGNOSIS — E55.9 VITAMIN D DEFICIENCY: ICD-10-CM

## 2024-01-09 DIAGNOSIS — G89.29 CHRONIC NECK PAIN: ICD-10-CM

## 2024-01-09 DIAGNOSIS — F17.210 SMOKING GREATER THAN 40 PACK YEARS: ICD-10-CM

## 2024-01-09 DIAGNOSIS — M54.2 CHRONIC NECK PAIN: ICD-10-CM

## 2024-01-09 DIAGNOSIS — Z76.89 ENCOUNTER TO ESTABLISH CARE WITH NEW DOCTOR: Primary | ICD-10-CM

## 2024-01-09 DIAGNOSIS — E78.2 MIXED HYPERLIPIDEMIA: ICD-10-CM

## 2024-01-09 DIAGNOSIS — K21.9 GASTROESOPHAGEAL REFLUX DISEASE WITHOUT ESOPHAGITIS: ICD-10-CM

## 2024-01-09 DIAGNOSIS — I25.10 CORONARY ARTERY DISEASE INVOLVING NATIVE CORONARY ARTERY OF NATIVE HEART WITHOUT ANGINA PECTORIS: ICD-10-CM

## 2024-01-09 DIAGNOSIS — G25.81 RESTLESS LEGS SYNDROME: ICD-10-CM

## 2024-01-09 PROCEDURE — 99204 OFFICE O/P NEW MOD 45 MIN: CPT | Performed by: STUDENT IN AN ORGANIZED HEALTH CARE EDUCATION/TRAINING PROGRAM

## 2024-01-09 PROCEDURE — 3080F DIAST BP >= 90 MM HG: CPT | Performed by: STUDENT IN AN ORGANIZED HEALTH CARE EDUCATION/TRAINING PROGRAM

## 2024-01-09 PROCEDURE — 90674 CCIIV4 VAC NO PRSV 0.5 ML IM: CPT | Performed by: STUDENT IN AN ORGANIZED HEALTH CARE EDUCATION/TRAINING PROGRAM

## 2024-01-09 PROCEDURE — 3077F SYST BP >= 140 MM HG: CPT | Performed by: STUDENT IN AN ORGANIZED HEALTH CARE EDUCATION/TRAINING PROGRAM

## 2024-01-09 PROCEDURE — 90471 IMMUNIZATION ADMIN: CPT | Performed by: STUDENT IN AN ORGANIZED HEALTH CARE EDUCATION/TRAINING PROGRAM

## 2024-01-09 RX ORDER — ASPIRIN 81 MG/1
81 TABLET, CHEWABLE ORAL DAILY
Qty: 30 TABLET | Refills: 3 | Status: SHIPPED | OUTPATIENT
Start: 2024-01-09

## 2024-01-09 RX ORDER — TIZANIDINE HYDROCHLORIDE 6 MG/1
6 CAPSULE, GELATIN COATED ORAL 3 TIMES DAILY
Qty: 90 CAPSULE | Refills: 3 | Status: SHIPPED | OUTPATIENT
Start: 2024-01-09

## 2024-01-09 RX ORDER — ISOSORBIDE MONONITRATE 60 MG/1
60 TABLET, EXTENDED RELEASE ORAL DAILY
Qty: 30 TABLET | Refills: 3 | Status: SHIPPED | OUTPATIENT
Start: 2024-01-09

## 2024-01-09 RX ORDER — PANTOPRAZOLE SODIUM 40 MG/1
40 TABLET, DELAYED RELEASE ORAL 2 TIMES DAILY
Qty: 60 TABLET | Refills: 3 | Status: SHIPPED | OUTPATIENT
Start: 2024-01-09

## 2024-01-09 RX ORDER — HYDROXYZINE PAMOATE 50 MG/1
50 CAPSULE ORAL 3 TIMES DAILY PRN
Qty: 60 CAPSULE | Refills: 3 | Status: SHIPPED | OUTPATIENT
Start: 2024-01-09

## 2024-01-09 RX ORDER — ROPINIROLE 3 MG/1
3 TABLET, FILM COATED ORAL 2 TIMES DAILY
Qty: 60 TABLET | Refills: 3 | Status: SHIPPED | OUTPATIENT
Start: 2024-01-09

## 2024-01-09 RX ORDER — METOPROLOL SUCCINATE 50 MG/1
50 TABLET, EXTENDED RELEASE ORAL DAILY
Qty: 30 TABLET | Refills: 3 | Status: SHIPPED | OUTPATIENT
Start: 2024-01-09

## 2024-01-09 RX ORDER — ROSUVASTATIN CALCIUM 20 MG/1
20 TABLET, COATED ORAL DAILY
Qty: 30 TABLET | Refills: 3 | Status: SHIPPED | OUTPATIENT
Start: 2024-01-09

## 2024-01-09 RX ORDER — OXYBUTYNIN CHLORIDE 15 MG/1
15 TABLET, EXTENDED RELEASE ORAL 2 TIMES DAILY
Qty: 60 TABLET | Refills: 3 | Status: SHIPPED | OUTPATIENT
Start: 2024-01-09 | End: 2024-01-12

## 2024-01-09 RX ORDER — QUETIAPINE FUMARATE 100 MG/1
100 TABLET, FILM COATED ORAL 2 TIMES DAILY
Qty: 60 TABLET | Refills: 3 | Status: SHIPPED | OUTPATIENT
Start: 2024-01-09

## 2024-01-09 RX ORDER — CHOLECALCIFEROL (VITAMIN D3) 125 MCG
1 CAPSULE ORAL DAILY
Qty: 30 TABLET | Refills: 3 | Status: SHIPPED | OUTPATIENT
Start: 2024-01-09

## 2024-01-09 RX ORDER — ALBUTEROL SULFATE 90 UG/1
2 AEROSOL, METERED RESPIRATORY (INHALATION) 4 TIMES DAILY PRN
Qty: 18 G | Refills: 0 | Status: SHIPPED | OUTPATIENT
Start: 2024-01-09

## 2024-01-09 ASSESSMENT — PATIENT HEALTH QUESTIONNAIRE - PHQ9
SUM OF ALL RESPONSES TO PHQ QUESTIONS 1-9: 0
1. LITTLE INTEREST OR PLEASURE IN DOING THINGS: 0
9. THOUGHTS THAT YOU WOULD BE BETTER OFF DEAD, OR OF HURTING YOURSELF: 0
SUM OF ALL RESPONSES TO PHQ QUESTIONS 1-9: 0
4. FEELING TIRED OR HAVING LITTLE ENERGY: 0
2. FEELING DOWN, DEPRESSED OR HOPELESS: 0
SUM OF ALL RESPONSES TO PHQ QUESTIONS 1-9: 0
SUM OF ALL RESPONSES TO PHQ9 QUESTIONS 1 & 2: 0
7. TROUBLE CONCENTRATING ON THINGS, SUCH AS READING THE NEWSPAPER OR WATCHING TELEVISION: 0
10. IF YOU CHECKED OFF ANY PROBLEMS, HOW DIFFICULT HAVE THESE PROBLEMS MADE IT FOR YOU TO DO YOUR WORK, TAKE CARE OF THINGS AT HOME, OR GET ALONG WITH OTHER PEOPLE: 0
3. TROUBLE FALLING OR STAYING ASLEEP: 0
5. POOR APPETITE OR OVEREATING: 0
6. FEELING BAD ABOUT YOURSELF - OR THAT YOU ARE A FAILURE OR HAVE LET YOURSELF OR YOUR FAMILY DOWN: 0
SUM OF ALL RESPONSES TO PHQ QUESTIONS 1-9: 0
8. MOVING OR SPEAKING SO SLOWLY THAT OTHER PEOPLE COULD HAVE NOTICED. OR THE OPPOSITE, BEING SO FIGETY OR RESTLESS THAT YOU HAVE BEEN MOVING AROUND A LOT MORE THAN USUAL: 0

## 2024-01-09 ASSESSMENT — ENCOUNTER SYMPTOMS
NAUSEA: 0
ABDOMINAL PAIN: 0
SHORTNESS OF BREATH: 0
VOMITING: 0

## 2024-01-09 NOTE — PROGRESS NOTES
[facility]  [facility]  [address]  [facility] [address]  Dept: [phone]  Dept Fax: [phone]    Digna Guaman is a 63 y.o. female who presents today as a new patient for her medical conditions, which are noted below.      Chief Complaint   Patient presents with    New Patient       Available notes and recent lab work have been reviewed.    HPI:     New patient to establish care.    Acute concerns: no concerns - recently got medical insurance, again. Needs refills from prior PCP who is no longer seeing new patients.    PMHx: HTN    PSHx: as noted below    FamHx: as noted below    SocHx:  Exercise: denies any specific regimen.  Alcohol: Occasional use  Tobacco: Smokes - 40+ pack years  Recreational: Denies marijuana, heroin, or cocaine.        LDL Cholesterol (mg/dL)   Date Value   04/25/2018 177 (H)   02/04/2016 226 (H)       (goal LDL is <100)   AST (U/L)   Date Value   11/27/2018 17     ALT (U/L)   Date Value   11/27/2018 9     BUN (mg/dL)   Date Value   11/28/2018 8     BP Readings from Last 3 Encounters:   01/09/24 (!) 176/92   04/23/19 117/69   01/28/19 (!) 156/84          (goal 120/80)    Past Medical History:   Diagnosis Date    Anxiety and depression     Asthma     Back injury     Cataracts, bilateral     Gastroesophageal reflux disease without esophagitis 08/13/2015    H/O blood clots     Headache(784.0)     Heart attack (HCC)     Hypertension     Knee injury     Osteoarthritis     Psoriasis     PTSD (post-traumatic stress disorder)       Past Surgical History:   Procedure Laterality Date    CERVIX SURGERY  1997/1998    ELBOW SURGERY      JOINT REPLACEMENT Right 10/28/2020    KNEE SURGERY      LEEP      NERVE BLOCK  2018       Family History   Problem Relation Age of Onset    Heart Disease Mother     High Blood Pressure Mother     Heart Disease Maternal Grandfather     Stroke Maternal Grandfather        Social History     Tobacco Use    Smoking status:

## 2024-01-12 DIAGNOSIS — N32.81 OVERACTIVE BLADDER: ICD-10-CM

## 2024-01-12 RX ORDER — OXYBUTYNIN CHLORIDE 15 MG/1
15 TABLET, EXTENDED RELEASE ORAL 2 TIMES DAILY
Qty: 180 TABLET | Refills: 1 | Status: SHIPPED | OUTPATIENT
Start: 2024-01-12

## 2024-01-12 NOTE — TELEPHONE ENCOUNTER
LAST VISIT:   1/9/2024     Future Appointments   Date Time Provider Department Center   1/23/2024  3:30 PM Husam Chapin, DO [facility]  [facility]       Pharmacy verified? Yes       [id number] [place] [place] - [address] - P [phone] - F [phone]  [address] [address]  Phone: [phone] Fax: [phone]

## 2024-01-20 SDOH — ECONOMIC STABILITY: HOUSING INSECURITY
IN THE LAST 12 MONTHS, WAS THERE A TIME WHEN YOU DID NOT HAVE A STEADY PLACE TO SLEEP OR SLEPT IN A SHELTER (INCLUDING NOW)?: YES

## 2024-01-20 SDOH — ECONOMIC STABILITY: TRANSPORTATION INSECURITY
IN THE PAST 12 MONTHS, HAS LACK OF TRANSPORTATION KEPT YOU FROM MEETINGS, WORK, OR FROM GETTING THINGS NEEDED FOR DAILY LIVING?: NO

## 2024-01-20 SDOH — ECONOMIC STABILITY: FOOD INSECURITY: WITHIN THE PAST 12 MONTHS, YOU WORRIED THAT YOUR FOOD WOULD RUN OUT BEFORE YOU GOT MONEY TO BUY MORE.: OFTEN TRUE

## 2024-01-20 SDOH — ECONOMIC STABILITY: INCOME INSECURITY: HOW HARD IS IT FOR YOU TO PAY FOR THE VERY BASICS LIKE FOOD, HOUSING, MEDICAL CARE, AND HEATING?: SOMEWHAT HARD

## 2024-01-20 SDOH — ECONOMIC STABILITY: FOOD INSECURITY: WITHIN THE PAST 12 MONTHS, THE FOOD YOU BOUGHT JUST DIDN'T LAST AND YOU DIDN'T HAVE MONEY TO GET MORE.: OFTEN TRUE

## 2024-01-23 ENCOUNTER — OFFICE VISIT (OUTPATIENT)
Dept: PRIMARY CARE CLINIC | Age: 64
End: 2024-01-23
Payer: COMMERCIAL

## 2024-01-23 VITALS
SYSTOLIC BLOOD PRESSURE: 128 MMHG | BODY MASS INDEX: 29.52 KG/M2 | WEIGHT: 172 LBS | HEART RATE: 84 BPM | DIASTOLIC BLOOD PRESSURE: 84 MMHG | OXYGEN SATURATION: 97 %

## 2024-01-23 DIAGNOSIS — Z72.0 TOBACCO ABUSE: ICD-10-CM

## 2024-01-23 DIAGNOSIS — F17.210 SMOKING GREATER THAN 40 PACK YEARS: ICD-10-CM

## 2024-01-23 DIAGNOSIS — I10 HYPERTENSION, UNSPECIFIED TYPE: Primary | ICD-10-CM

## 2024-01-23 DIAGNOSIS — K21.9 GASTROESOPHAGEAL REFLUX DISEASE WITHOUT ESOPHAGITIS: ICD-10-CM

## 2024-01-23 DIAGNOSIS — G25.81 RESTLESS LEGS SYNDROME: ICD-10-CM

## 2024-01-23 DIAGNOSIS — Z12.31 ENCOUNTER FOR SCREENING MAMMOGRAM FOR MALIGNANT NEOPLASM OF BREAST: ICD-10-CM

## 2024-01-23 DIAGNOSIS — Z74.09 IMPAIRED MOBILITY: ICD-10-CM

## 2024-01-23 DIAGNOSIS — E78.2 MIXED HYPERLIPIDEMIA: ICD-10-CM

## 2024-01-23 PROCEDURE — 3074F SYST BP LT 130 MM HG: CPT | Performed by: STUDENT IN AN ORGANIZED HEALTH CARE EDUCATION/TRAINING PROGRAM

## 2024-01-23 PROCEDURE — 99214 OFFICE O/P EST MOD 30 MIN: CPT | Performed by: STUDENT IN AN ORGANIZED HEALTH CARE EDUCATION/TRAINING PROGRAM

## 2024-01-23 PROCEDURE — 3079F DIAST BP 80-89 MM HG: CPT | Performed by: STUDENT IN AN ORGANIZED HEALTH CARE EDUCATION/TRAINING PROGRAM

## 2024-01-23 ASSESSMENT — ENCOUNTER SYMPTOMS
ABDOMINAL PAIN: 0
VOMITING: 0
NAUSEA: 0
SHORTNESS OF BREATH: 0

## 2024-01-23 NOTE — PROGRESS NOTES
Tobacco abuse        5. Smoking greater than 40 pack years        6. Gastroesophageal reflux disease without esophagitis        7. Impaired mobility  Handicap placard      8. Encounter for screening mammogram for malignant neoplasm of breast  MANDI DIGITAL SCREEN W OR WO CAD BILATERAL           BP in office is 128/84, which is at target with current regimen of imdur 60 mg daily, metoprolol 50 mg daily. GERD symptoms are well controlled with pantoprazole 40 mg daily.  Urinary symptoms controlled with oxybutynin 15 mg BID, does not seem to have any anti-cholinergic effects at this time.  Tolerates her statin medication, which she should remain on due to hx of MI.  Restless legs controlled with ropinirole.   Plan:   Advised to quit smoking. Discussed LDCT, which she declined.  Mammogram ordered  Continue imdur and metoprolol for HTN  Continue ropinirole for RLS  Continue pantoprazole for GERD  Continue statin for dyslipidemia      Return in about 3 months (around 4/23/2024).    Orders Placed This Encounter   Procedures    Handicap placard    MANDI DIGITAL SCREEN W OR WO CAD BILATERAL     Standing Status:   Future     Standing Expiration Date:   3/23/2025     Order Specific Question:   Reason for exam:     Answer:   routine screening     No orders of the defined types were placed in this encounter.          Discussed risks and benefits of above plan. All patient questions were answered prior to patient leaving the office, today. Reviewed health maintenance. Instructed regarding current medications, diet, and exercise. Patient agreed with treatment plan.    Electronically signed by Husam Chapin DO on 1/23/2024 at 3:46 PM

## 2024-02-01 DIAGNOSIS — J45.20 MILD INTERMITTENT ASTHMA, UNSPECIFIED WHETHER COMPLICATED: ICD-10-CM

## 2024-02-01 RX ORDER — ALBUTEROL SULFATE 90 UG/1
2 AEROSOL, METERED RESPIRATORY (INHALATION) 4 TIMES DAILY PRN
Qty: 18 EACH | Refills: 3 | Status: SHIPPED | OUTPATIENT
Start: 2024-02-01

## 2024-02-07 DIAGNOSIS — K21.9 GASTROESOPHAGEAL REFLUX DISEASE WITHOUT ESOPHAGITIS: ICD-10-CM

## 2024-02-07 RX ORDER — PANTOPRAZOLE SODIUM 40 MG/1
40 TABLET, DELAYED RELEASE ORAL 2 TIMES DAILY
Qty: 180 TABLET | Refills: 1 | Status: SHIPPED | OUTPATIENT
Start: 2024-02-07 | End: 2024-08-05

## 2024-02-07 NOTE — TELEPHONE ENCOUNTER
LAST VISIT:   1/23/2024     Future Appointments   Date Time Provider Department Center   4/23/2024  3:30 PM Husam Chapin, DO [facility]  [facility]       Pharmacy verified? Yes    [facility] [id number] [facility] - [place] [id number] [facility] [address] - P [phone] - F [phone]

## 2024-02-12 ENCOUNTER — TELEPHONE (OUTPATIENT)
Dept: PRIMARY CARE CLINIC | Age: 64
End: 2024-02-12

## 2024-02-12 DIAGNOSIS — K21.9 GASTROESOPHAGEAL REFLUX DISEASE WITHOUT ESOPHAGITIS: Primary | ICD-10-CM

## 2024-02-12 NOTE — TELEPHONE ENCOUNTER
Fax received from the pharmacy now stating that insurance will only cover 90 pills per year of the pantoprazole. Pharmacy is suggesting either lansoprazole DR 30 mg capsule, esomeprazole mag DR 20 mg cap or rabeprazole sod DR 20 mg tab as alternatives. Please advise.

## 2024-02-23 ENCOUNTER — TELEPHONE (OUTPATIENT)
Dept: PRIMARY CARE CLINIC | Age: 64
End: 2024-02-23

## 2024-02-23 DIAGNOSIS — G89.29 CHRONIC NECK PAIN: Primary | ICD-10-CM

## 2024-02-23 DIAGNOSIS — M54.2 CHRONIC NECK PAIN: Primary | ICD-10-CM

## 2024-02-23 RX ORDER — TIZANIDINE 4 MG/1
4 TABLET ORAL 3 TIMES DAILY PRN
Qty: 30 TABLET | Refills: 0 | Status: SHIPPED | OUTPATIENT
Start: 2024-02-23

## 2024-02-23 NOTE — TELEPHONE ENCOUNTER
Pt states her insurance will not cover tizanidine capsules but will cover the 4 mg tablets. Please send in if appropriate. [facility]

## 2024-03-06 DIAGNOSIS — F32.A ANXIETY AND DEPRESSION: ICD-10-CM

## 2024-03-06 DIAGNOSIS — F41.9 ANXIETY AND DEPRESSION: ICD-10-CM

## 2024-03-06 RX ORDER — HYDROXYZINE PAMOATE 50 MG/1
50 CAPSULE ORAL 3 TIMES DAILY PRN
Qty: 60 CAPSULE | Refills: 3 | Status: SHIPPED | OUTPATIENT
Start: 2024-03-06

## 2024-03-06 NOTE — TELEPHONE ENCOUNTER
LAST VISIT:   1/23/2024     Future Appointments   Date Time Provider Department Center   4/23/2024  3:30 PM Husam Chapin, DO [facility]  [facility]       Pharmacy verified? Yes    [facility] [id number] [facility] - [place] [id number] [facility] [address] - P [phone] - F [phone]

## 2024-03-09 DIAGNOSIS — F32.A ANXIETY AND DEPRESSION: ICD-10-CM

## 2024-03-09 DIAGNOSIS — E78.2 MIXED HYPERLIPIDEMIA: ICD-10-CM

## 2024-03-09 DIAGNOSIS — I25.10 CORONARY ARTERY DISEASE INVOLVING NATIVE CORONARY ARTERY OF NATIVE HEART WITHOUT ANGINA PECTORIS: ICD-10-CM

## 2024-03-09 DIAGNOSIS — F41.9 ANXIETY AND DEPRESSION: ICD-10-CM

## 2024-03-11 RX ORDER — ROSUVASTATIN CALCIUM 20 MG/1
20 TABLET, COATED ORAL DAILY
Qty: 90 TABLET | Refills: 1 | Status: SHIPPED | OUTPATIENT
Start: 2024-03-11

## 2024-03-11 RX ORDER — ASPIRIN 81 MG
TABLET,CHEWABLE ORAL
Qty: 90 TABLET | Refills: 1 | Status: SHIPPED | OUTPATIENT
Start: 2024-03-11

## 2024-03-11 RX ORDER — QUETIAPINE FUMARATE 100 MG/1
100 TABLET, FILM COATED ORAL 2 TIMES DAILY
Qty: 180 TABLET | Refills: 1 | Status: SHIPPED | OUTPATIENT
Start: 2024-03-11

## 2024-03-31 DIAGNOSIS — I10 HYPERTENSION, UNSPECIFIED TYPE: ICD-10-CM

## 2024-04-01 RX ORDER — ISOSORBIDE MONONITRATE 60 MG/1
60 TABLET, EXTENDED RELEASE ORAL DAILY
Qty: 90 TABLET | Refills: 1 | Status: SHIPPED | OUTPATIENT
Start: 2024-04-01

## 2024-04-01 RX ORDER — METOPROLOL SUCCINATE 50 MG/1
50 TABLET, EXTENDED RELEASE ORAL DAILY
Qty: 90 TABLET | Refills: 1 | Status: SHIPPED | OUTPATIENT
Start: 2024-04-01

## 2024-04-03 DIAGNOSIS — G25.81 RESTLESS LEGS SYNDROME: ICD-10-CM

## 2024-04-03 DIAGNOSIS — E55.9 VITAMIN D DEFICIENCY: ICD-10-CM

## 2024-04-03 RX ORDER — ROPINIROLE 3 MG/1
TABLET, FILM COATED ORAL
Qty: 180 TABLET | Refills: 1 | Status: SHIPPED | OUTPATIENT
Start: 2024-04-03

## 2024-04-03 RX ORDER — CHOLECALCIFEROL (VITAMIN D3) 50 MCG
1 TABLET ORAL DAILY
Qty: 90 TABLET | Refills: 1 | Status: SHIPPED | OUTPATIENT
Start: 2024-04-03

## 2024-04-11 ENCOUNTER — HOSPITAL ENCOUNTER (EMERGENCY)
Age: 64
Discharge: HOME | End: 2024-04-11
Attending: EMERGENCY MEDICINE
Payer: COMMERCIAL

## 2024-04-11 ENCOUNTER — APPOINTMENT (OUTPATIENT)
Dept: CT IMAGING | Age: 64
End: 2024-04-11
Payer: COMMERCIAL

## 2024-04-11 VITALS
OXYGEN SATURATION: 97 % | DIASTOLIC BLOOD PRESSURE: 65 MMHG | SYSTOLIC BLOOD PRESSURE: 135 MMHG | HEIGHT: 64 IN | BODY MASS INDEX: 29.37 KG/M2 | RESPIRATION RATE: 16 BRPM | TEMPERATURE: 97.3 F | HEART RATE: 59 BPM | WEIGHT: 172 LBS

## 2024-04-11 DIAGNOSIS — M47.22 CERVICAL RADICULOPATHY DUE TO DEGENERATIVE JOINT DISEASE OF SPINE: Primary | ICD-10-CM

## 2024-04-11 PROCEDURE — 6370000000 HC RX 637 (ALT 250 FOR IP)

## 2024-04-11 PROCEDURE — 96372 THER/PROPH/DIAG INJ SC/IM: CPT | Performed by: EMERGENCY MEDICINE

## 2024-04-11 PROCEDURE — 72125 CT NECK SPINE W/O DYE: CPT

## 2024-04-11 PROCEDURE — 99284 EMERGENCY DEPT VISIT MOD MDM: CPT | Performed by: EMERGENCY MEDICINE

## 2024-04-11 PROCEDURE — 6360000002 HC RX W HCPCS

## 2024-04-11 RX ORDER — LIDOCAINE 4 G/G
1 PATCH TOPICAL DAILY
Qty: 30 PATCH | Refills: 0 | Status: SHIPPED | OUTPATIENT
Start: 2024-04-11 | End: 2024-05-11

## 2024-04-11 RX ORDER — DEXAMETHASONE SODIUM PHOSPHATE 10 MG/ML
10 INJECTION, SOLUTION INTRAMUSCULAR; INTRAVENOUS ONCE
Status: COMPLETED | OUTPATIENT
Start: 2024-04-11 | End: 2024-04-11

## 2024-04-11 RX ORDER — KETOROLAC TROMETHAMINE 30 MG/ML
30 INJECTION, SOLUTION INTRAMUSCULAR; INTRAVENOUS ONCE
Status: COMPLETED | OUTPATIENT
Start: 2024-04-11 | End: 2024-04-11

## 2024-04-11 RX ORDER — DIAZEPAM 5 MG/1
5 TABLET ORAL ONCE
Status: COMPLETED | OUTPATIENT
Start: 2024-04-11 | End: 2024-04-11

## 2024-04-11 RX ORDER — LIDOCAINE 4 G/G
1 PATCH TOPICAL ONCE
Status: DISCONTINUED | OUTPATIENT
Start: 2024-04-11 | End: 2024-04-11 | Stop reason: HOSPADM

## 2024-04-11 RX ADMIN — KETOROLAC TROMETHAMINE 30 MG: 30 INJECTION, SOLUTION INTRAMUSCULAR; INTRAVENOUS at 10:11

## 2024-04-11 RX ADMIN — DIAZEPAM 5 MG: 5 TABLET ORAL at 10:09

## 2024-04-11 RX ADMIN — DEXAMETHASONE SODIUM PHOSPHATE 10 MG: 10 INJECTION, SOLUTION INTRAMUSCULAR; INTRAVENOUS at 10:10

## 2024-04-11 ASSESSMENT — PAIN SCALES - GENERAL
PAINLEVEL_OUTOF10: 4
PAINLEVEL_OUTOF10: 7

## 2024-04-11 ASSESSMENT — ENCOUNTER SYMPTOMS
VOMITING: 0
CHEST TIGHTNESS: 0
BACK PAIN: 1
DIARRHEA: 0
SHORTNESS OF BREATH: 0
NAUSEA: 0
COUGH: 0
ABDOMINAL PAIN: 0
CONSTIPATION: 0

## 2024-04-11 ASSESSMENT — PAIN DESCRIPTION - LOCATION
LOCATION: BACK;NECK
LOCATION: BACK;NECK

## 2024-04-11 ASSESSMENT — PAIN DESCRIPTION - ORIENTATION: ORIENTATION: RIGHT

## 2024-04-11 ASSESSMENT — PAIN - FUNCTIONAL ASSESSMENT: PAIN_FUNCTIONAL_ASSESSMENT: 0-10

## 2024-04-11 ASSESSMENT — PAIN DESCRIPTION - PAIN TYPE: TYPE: ACUTE PAIN;CHRONIC PAIN

## 2024-04-11 NOTE — ED PROVIDER NOTES
DO Husam   albuterol sulfate HFA (PROVENTIL;VENTOLIN;PROAIR) 108 (90 Base) MCG/ACT inhaler INHALE 2 PUFFS INTO THE LUNGS 4 TIMES DAILY AS NEEDED FOR WHEEZING 2/1/24   Husam Chapin DO   oxyBUTYnin (DITROPAN XL) 15 MG extended release tablet TAKE 1 TABLET BY MOUTH TWICE A DAY 1/12/24   Husam Chapin DO   topiramate (TOPAMAX) 50 MG tablet take 3 tablets by mouth once daily 10/30/23   Yoly Julio MD   NURTEC 75 MG TBDP TAKE 1 TABLET BY MOUTH AS NEEDED (AS NEEDED FOR HEADACHE DAILY). 4/17/23   Yoly Julio MD   rizatriptan (MAXALT-MLT) 5 MG disintegrating tablet DISSOLVE 1 TABLET ON THE TONGUE 1 TIME AS NEEDED FOR MIGRAINE. MAY. REPEAT IN 2 HOURS AS NEEDED 9/20/22   Yoly Julio MD   SUMAtriptan Succinate 6 MG/0.5ML SOAJ Inject 0.5 ml into skin at headache onset, may repeat in 2 hours PRN, no more than 2 injections per 24 hour period. 2/16/22   Yoly Julio MD   butalbital-acetaminophen-caffeine (FIORICET, ESGIC) -40 MG per tablet Take 1 tablet by mouth daily as needed for Headaches 6/9/21 1/9/25  Yoly Julio MD   chlordiazePOXIDE-clidinium (LIBRAX) 5-2.5 MG per capsule Take 1 capsule by mouth daily as needed (abdominal migraine) for up to 30 days. 4/23/19 1/9/25  Yoly Julio MD   fexofenadine (ALLEGRA) 180 MG tablet Take 1 tablet by mouth daily as needed 8/8/16   Joi Alvarez MD   ibuprofen (ADVIL;MOTRIN) 800 MG tablet Take 1 tablet by mouth every 8 hours as needed 9/10/16   Joi Alvarez MD   COMBIVENT RESPIMAT  MCG/ACT AERS inhaler Inhale 2 puffs into the lungs as needed 9/28/16   Joi Alvarez MD   ondansetron (ZOFRAN-ODT) 4 MG disintegrating tablet TAKE 1 TABLET BY MOUTH EVERY 8 HOURS AS NEEDED FOR NAUSEA 7/12/16   Jovanny Campbell MD   fluticasone (FLOVENT HFA) 220 MCG/ACT inhaler Inhale 1 puff into the lungs 2 times daily  Patient taking differently: Inhale 1 puff into the lungs as needed 12/11/15   Oscar Bautista MD       REVIEW OF  Patient with symptoms of cervical radiculopathy, will obtain a CT of her cervical spine as she has not had any recent imaging since her nerve ablation.  She is afebrile, nontachycardic and 97% on room air, no history of IV drug use, very low concern for SCA.  Will give IM Toradol, IM Decadron and p.o. Valium with lidocaine patch applied to right trapezius muscle where she indicates she has the most pain.    Ddx: Herniated disc, degenerative disc disease, muscle spasm, cervical radicular pain    PLAN (LABS / IMAGING / EKG):  Orders Placed This Encounter   Procedures    CT CERVICAL SPINE WO CONTRAST       MEDICATIONS ORDERED:  Orders Placed This Encounter   Medications    dexAMETHasone (PF) (DECADRON) injection 10 mg    diazePAM (VALIUM) tablet 5 mg    DISCONTD: lidocaine 4 % external patch 1 patch    ketorolac (TORADOL) injection 30 mg    lidocaine 4 % external patch     Sig: Place 1 patch onto the skin daily     Dispense:  30 patch     Refill:  0       Controlled Substances Monitoring:Periodic Controlled Substance Monitoring: No signs of potential drug abuse or diversion identified. (Lizeth Liang, APRN - CNP)    DIAGNOSTIC RESULTS     EKG: All EKG's are interpreted by the Emergency Department Physician who either signs or Co-signs this chart in the absenceof a cardiologist.      RADIOLOGY: All images are read by the radiologist and their interpretations are reviewed.    CT CERVICAL SPINE WO CONTRAST    Result Date: 4/18/2024  EXAMINATION: CT OF THE CERVICAL SPINE WITHOUT CONTRAST 4/11/2024 10:09 am TECHNIQUE: CT of the cervical spine was performed without the administration of intravenous contrast. Multiplanar reformatted images are provided for review. Automated exposure control, iterative reconstruction, and/or weight based adjustment of the mA/kV was utilized to reduce the radiation dose to as low as reasonably achievable. COMPARISON: 11/27/2018 HISTORY: ORDERING SYSTEM PROVIDED HISTORY: pain, hx of nerve

## 2024-04-11 NOTE — DISCHARGE INSTRUCTIONS
If given narcotics (opiates) or muscle relaxants during this Emergency Department visit, you should not drink, drive or operate any machinery for at least 6 hours.    Use an ice pack or bag filled with ice and apply to the injured area 3 - 4 times a day for 15 - 20 minutes each time.  If the injury is older than 3 days, then use a heating pad to help relax the muscles in your neck.    For pain use acetaminophen (Tylenol) or ibuprofen (Motrin / Advil), unless prescribed medications that have acetaminophen or ibuprofen (or similar medications) in it.  You can take over the counter acetaminophen tablets (1 - 2 tablets of the 500-mg strength every 6 hours) or ibuprofen tablets (2 tablets every 4 hours).    You can purchase lidocaine patches over-the-counter, apply to the affected area.  12 hours on, 12 hours off.  No more than 3 patches at 1 time.`    PLEASE RETURN TO THE EMERGENCY DEPARTMENT IMMEDIATELY for worsening symptoms, inability to move your legs, tingling or loss of sensation, or if you develop any concerning symptoms such as: high fever not relieved by acetaminophen (Tylenol) and/or ibuprofen (Motrin / Advil), chills, shortness of breath, chest pain, feeling of your heart fluttering or racing, persistent nausea and/or vomiting, vomiting up blood, blood in your stool, loss of consciousness, numbness, weakness or tingling in the arms or legs or change in color of the extremities, changes in mental status, persistent headache, blurry vision, loss of bladder / bowel control, unable to follow up with your physician, or other any other care or concern.

## 2024-04-11 NOTE — ED PROVIDER NOTES
[facility] Emergency Department    [address]  [facility] [address]  Phone: [phone]  Fax: [phone]  Emergency Department  Faculty Attestation    I performed a history and physical examination of the patient and discussed management with the mid level provider. I reviewed the mid level provider's note and agree with the documented findings and plan of care. Any areas of disagreement are noted on the chart. I was personally present for the key portions of any procedures. I have documented in the chart those procedures where I was not present during the key portions. I have reviewed the emergency nurses triage note. I agree with the chief complaint, past medical history, past surgical history, allergies, medications, social and family history as documented unless otherwise noted below. Documentation of the HPI, Physical Exam and Medical Decision Making performed by medical students or scribes is based on my personal performance of the HPI, PE and MDM. For Physician Assistant/ Nurse Practitioner cases/documentation I have personally evaluated this patient and have completed at least one if not all key elements of the E/M (history, physical exam, and MDM). Additional findings are as noted.      Primary Care Physician:  Husam Chapin DO    CHIEF COMPLAINT       Chief Complaint   Patient presents with    Neck Pain     Ablation in 2022- increased pain- denies injury    Back Pain       RECENT VITALS:   Temp: 97.3 °F (36.3 °C),  Pulse: 59, Respirations: 16, BP: 135/65    LABS:  Labs Reviewed - No data to display        CT CERVICAL SPINE WO CONTRAST (Preliminary result)  Result time 04/11/24 10:46:04  Preliminary result by Cosmo Sierra MD (04/11/24 10:46:04)                Impression:    No acute abnormality of the cervical spine.    Mild-to-moderate disc and facet degenerative changes as described above with  left greater than right neural foraminal narrowing as described.   No severe  spinal canal stenosis.    Mild centrilobular emphysema.            Narrative:    EXAMINATION:  CT OF THE CERVICAL SPINE WITHOUT CONTRAST 4/11/2024 10:09 am    TECHNIQUE:  CT of the cervical spine was performed without the administration of  intravenous contrast. Multiplanar reformatted images are provided for review.  Automated exposure control, iterative reconstruction, and/or weight based  adjustment of the mA/kV was utilized to reduce the radiation dose to as low  as reasonably achievable.    COMPARISON:  11/27/2018    HISTORY:  ORDERING SYSTEM PROVIDED HISTORY: pain, hx of nerve ablation  TECHNOLOGIST PROVIDED HISTORY:  pain, hx of nerve ablation  Decision Support Exception - unselect if not a suspected or confirmed  emergency medical condition->Emergency Medical Condition (MA)  Reason for Exam: pain, hx of nerve ablation pt states pain x 27 years    FINDINGS:  BONES/ALIGNMENT: There is no acute fracture or traumatic malalignment.    DEGENERATIVE CHANGES: Mild C5-6 and C6-7 degenerative changes.  Left sided  C3-4 and C4-5 facet arthritis.  No severe spinal canal stenosis or narrowing.    C2-C3: There is no significant disc protrusion, spinal canal stenosis or  neural foraminal narrowing.    C3-C4: Hypertrophic sided C3-4 facet arthritis.  This causes mild left-sided  neural foraminal narrowing.  No canal narrowing.    C4-C5: Left-sided facet arthritis.  Along with mild uncovertebral spurring  there is moderate left and mild right neural foraminal narrowing.  No severe  canal narrowing.    C5-C6: Disc degenerative changes cause uncovertebral squaring that along with  left-sided facet arthritis causes moderate left and mild right neural  foraminal narrowing.  No significant canal narrowing.    C6-C7: There is no significant disc protrusion, spinal canal stenosis or  neural foraminal narrowing.    C7-T1: There is no significant disc protrusion, spinal canal stenosis or  neural foraminal

## 2024-04-26 DIAGNOSIS — K21.9 GASTROESOPHAGEAL REFLUX DISEASE WITHOUT ESOPHAGITIS: ICD-10-CM

## 2024-04-26 NOTE — TELEPHONE ENCOUNTER
Patient states the Nexium is not working for her reflux. Patient is asking for a new script of the Protonix and she will pay for it out of pocket.

## 2024-04-29 RX ORDER — PANTOPRAZOLE SODIUM 40 MG/1
40 TABLET, DELAYED RELEASE ORAL 2 TIMES DAILY
Qty: 180 TABLET | Refills: 1 | Status: SHIPPED | OUTPATIENT
Start: 2024-04-29 | End: 2024-10-26

## 2024-09-11 DIAGNOSIS — I10 HYPERTENSION, UNSPECIFIED TYPE: ICD-10-CM

## 2024-09-11 DIAGNOSIS — F32.A ANXIETY AND DEPRESSION: ICD-10-CM

## 2024-09-11 DIAGNOSIS — E78.2 MIXED HYPERLIPIDEMIA: ICD-10-CM

## 2024-09-11 DIAGNOSIS — N32.81 OVERACTIVE BLADDER: ICD-10-CM

## 2024-09-11 DIAGNOSIS — F41.9 ANXIETY AND DEPRESSION: ICD-10-CM

## 2024-09-11 DIAGNOSIS — G25.81 RESTLESS LEGS SYNDROME: ICD-10-CM

## 2024-09-12 RX ORDER — ISOSORBIDE MONONITRATE 60 MG/1
60 TABLET, EXTENDED RELEASE ORAL DAILY
Qty: 90 TABLET | Refills: 0 | Status: SHIPPED | OUTPATIENT
Start: 2024-09-12

## 2024-09-12 RX ORDER — ISOSORBIDE MONONITRATE 60 MG/1
60 TABLET, EXTENDED RELEASE ORAL DAILY
Qty: 90 TABLET | Refills: 0 | Status: SHIPPED | OUTPATIENT
Start: 2024-09-12 | End: 2024-09-12

## 2024-09-12 RX ORDER — ROSUVASTATIN CALCIUM 20 MG/1
20 TABLET, COATED ORAL DAILY
Qty: 90 TABLET | Refills: 0 | Status: SHIPPED | OUTPATIENT
Start: 2024-09-12 | End: 2024-09-12

## 2024-09-12 RX ORDER — ROPINIROLE 3 MG/1
3 TABLET, FILM COATED ORAL NIGHTLY
Qty: 180 TABLET | Refills: 0 | Status: SHIPPED | OUTPATIENT
Start: 2024-09-12

## 2024-09-12 RX ORDER — OXYBUTYNIN CHLORIDE 15 MG/1
15 TABLET, EXTENDED RELEASE ORAL 2 TIMES DAILY
Qty: 180 TABLET | Refills: 0 | Status: SHIPPED | OUTPATIENT
Start: 2024-09-12

## 2024-09-12 RX ORDER — QUETIAPINE FUMARATE 100 MG/1
100 TABLET, FILM COATED ORAL 2 TIMES DAILY
Qty: 180 TABLET | Refills: 0 | Status: SHIPPED | OUTPATIENT
Start: 2024-09-12

## 2024-09-12 RX ORDER — ROSUVASTATIN CALCIUM 20 MG/1
20 TABLET, COATED ORAL DAILY
Qty: 90 TABLET | Refills: 0 | Status: SHIPPED | OUTPATIENT
Start: 2024-09-12

## 2024-09-12 RX ORDER — OXYBUTYNIN CHLORIDE 15 MG/1
15 TABLET, EXTENDED RELEASE ORAL 2 TIMES DAILY
Qty: 180 TABLET | Refills: 0 | Status: SHIPPED | OUTPATIENT
Start: 2024-09-12 | End: 2024-09-12

## 2024-09-12 RX ORDER — METOPROLOL SUCCINATE 50 MG/1
50 TABLET, EXTENDED RELEASE ORAL DAILY
Qty: 90 TABLET | Refills: 0 | Status: SHIPPED | OUTPATIENT
Start: 2024-09-12

## 2024-09-12 RX ORDER — QUETIAPINE FUMARATE 100 MG/1
100 TABLET, FILM COATED ORAL 2 TIMES DAILY
Qty: 180 TABLET | Refills: 0 | Status: SHIPPED | OUTPATIENT
Start: 2024-09-12 | End: 2024-09-12

## 2024-09-12 RX ORDER — METOPROLOL SUCCINATE 50 MG/1
50 TABLET, EXTENDED RELEASE ORAL DAILY
Qty: 90 TABLET | Refills: 0 | Status: SHIPPED | OUTPATIENT
Start: 2024-09-12 | End: 2024-09-12

## 2024-09-12 RX ORDER — ROPINIROLE 3 MG/1
TABLET, FILM COATED ORAL
Qty: 180 TABLET | Refills: 0 | Status: SHIPPED | OUTPATIENT
Start: 2024-09-12 | End: 2024-09-12

## 2024-10-09 ENCOUNTER — TELEPHONE (OUTPATIENT)
Dept: PRIMARY CARE CLINIC | Age: 64
End: 2024-10-09

## 2024-11-19 DIAGNOSIS — F41.9 ANXIETY AND DEPRESSION: ICD-10-CM

## 2024-11-19 DIAGNOSIS — F32.A ANXIETY AND DEPRESSION: ICD-10-CM

## 2024-11-19 RX ORDER — HYDROXYZINE PAMOATE 50 MG/1
CAPSULE ORAL
Qty: 90 CAPSULE | Refills: 2 | Status: SHIPPED | OUTPATIENT
Start: 2024-11-19

## 2024-11-19 NOTE — TELEPHONE ENCOUNTER
LAST VISIT:   1/23/2024     No future appointments.    Pharmacy verified? Yes     [id number] [place], [place] - [address] [facility] [address] - P [phone] - F [phone]  [address] [place] [address]  Phone: [phone] Fax: [phone]

## 2025-01-07 DIAGNOSIS — J45.20 MILD INTERMITTENT ASTHMA, UNSPECIFIED WHETHER COMPLICATED: ICD-10-CM

## 2025-01-09 RX ORDER — ALBUTEROL SULFATE 90 UG/1
2 INHALANT RESPIRATORY (INHALATION) 4 TIMES DAILY PRN
Qty: 18 EACH | Refills: 3 | OUTPATIENT
Start: 2025-01-09

## 2025-01-27 ENCOUNTER — OFFICE VISIT (OUTPATIENT)
Dept: PRIMARY CARE CLINIC | Age: 65
End: 2025-01-27
Payer: MEDICAID

## 2025-01-27 VITALS
DIASTOLIC BLOOD PRESSURE: 80 MMHG | SYSTOLIC BLOOD PRESSURE: 118 MMHG | BODY MASS INDEX: 32.95 KG/M2 | WEIGHT: 193 LBS | HEIGHT: 64 IN | OXYGEN SATURATION: 97 % | HEART RATE: 61 BPM

## 2025-01-27 DIAGNOSIS — I25.10 CORONARY ARTERY DISEASE INVOLVING NATIVE CORONARY ARTERY OF NATIVE HEART WITHOUT ANGINA PECTORIS: ICD-10-CM

## 2025-01-27 DIAGNOSIS — F32.A ANXIETY AND DEPRESSION: ICD-10-CM

## 2025-01-27 DIAGNOSIS — K21.9 GASTROESOPHAGEAL REFLUX DISEASE WITHOUT ESOPHAGITIS: ICD-10-CM

## 2025-01-27 DIAGNOSIS — R73.03 PREDIABETES: ICD-10-CM

## 2025-01-27 DIAGNOSIS — G43.711 CHRONIC MIGRAINE WITHOUT AURA, WITH INTRACTABLE MIGRAINE, SO STATED, WITH STATUS MIGRAINOSUS: ICD-10-CM

## 2025-01-27 DIAGNOSIS — R68.2 DRY MOUTH: ICD-10-CM

## 2025-01-27 DIAGNOSIS — Z23 NEED FOR INFLUENZA VACCINATION: ICD-10-CM

## 2025-01-27 DIAGNOSIS — I10 HYPERTENSION, UNSPECIFIED TYPE: Primary | ICD-10-CM

## 2025-01-27 DIAGNOSIS — E78.2 MIXED HYPERLIPIDEMIA: ICD-10-CM

## 2025-01-27 DIAGNOSIS — G43.D0 ABDOMINAL MIGRAINE, NOT INTRACTABLE: ICD-10-CM

## 2025-01-27 DIAGNOSIS — G43.009 MIGRAINE WITHOUT AURA AND WITHOUT STATUS MIGRAINOSUS, NOT INTRACTABLE: ICD-10-CM

## 2025-01-27 DIAGNOSIS — N32.81 OVERACTIVE BLADDER: ICD-10-CM

## 2025-01-27 DIAGNOSIS — M54.2 CHRONIC NECK PAIN: ICD-10-CM

## 2025-01-27 DIAGNOSIS — E55.9 VITAMIN D DEFICIENCY: ICD-10-CM

## 2025-01-27 DIAGNOSIS — F41.9 ANXIETY AND DEPRESSION: ICD-10-CM

## 2025-01-27 DIAGNOSIS — J45.20 MILD INTERMITTENT ASTHMA, UNSPECIFIED WHETHER COMPLICATED: ICD-10-CM

## 2025-01-27 DIAGNOSIS — G89.29 CHRONIC NECK PAIN: ICD-10-CM

## 2025-01-27 DIAGNOSIS — G25.81 RESTLESS LEGS SYNDROME: ICD-10-CM

## 2025-01-27 PROBLEM — F17.200 SMOKER: Status: ACTIVE | Noted: 2019-01-09

## 2025-01-27 PROCEDURE — 90661 CCIIV3 VAC ABX FR 0.5 ML IM: CPT | Performed by: STUDENT IN AN ORGANIZED HEALTH CARE EDUCATION/TRAINING PROGRAM

## 2025-01-27 PROCEDURE — 99215 OFFICE O/P EST HI 40 MIN: CPT | Performed by: STUDENT IN AN ORGANIZED HEALTH CARE EDUCATION/TRAINING PROGRAM

## 2025-01-27 PROCEDURE — 90471 IMMUNIZATION ADMIN: CPT | Performed by: STUDENT IN AN ORGANIZED HEALTH CARE EDUCATION/TRAINING PROGRAM

## 2025-01-27 PROCEDURE — 3079F DIAST BP 80-89 MM HG: CPT | Performed by: STUDENT IN AN ORGANIZED HEALTH CARE EDUCATION/TRAINING PROGRAM

## 2025-01-27 PROCEDURE — 3074F SYST BP LT 130 MM HG: CPT | Performed by: STUDENT IN AN ORGANIZED HEALTH CARE EDUCATION/TRAINING PROGRAM

## 2025-01-27 RX ORDER — METOPROLOL SUCCINATE 50 MG/1
50 TABLET, EXTENDED RELEASE ORAL DAILY
Qty: 90 TABLET | Refills: 0 | Status: SHIPPED | OUTPATIENT
Start: 2025-01-27

## 2025-01-27 RX ORDER — ROSUVASTATIN CALCIUM 20 MG/1
20 TABLET, COATED ORAL DAILY
Qty: 90 TABLET | Refills: 1 | Status: SHIPPED | OUTPATIENT
Start: 2025-01-27

## 2025-01-27 RX ORDER — OXYBUTYNIN CHLORIDE 15 MG/1
15 TABLET, EXTENDED RELEASE ORAL 2 TIMES DAILY
Qty: 180 TABLET | Refills: 0 | Status: SHIPPED | OUTPATIENT
Start: 2025-01-27

## 2025-01-27 RX ORDER — TOPIRAMATE 50 MG/1
TABLET, FILM COATED ORAL
Qty: 270 TABLET | Refills: 1 | Status: SHIPPED | OUTPATIENT
Start: 2025-01-27

## 2025-01-27 RX ORDER — PANTOPRAZOLE SODIUM 40 MG/1
40 TABLET, DELAYED RELEASE ORAL 2 TIMES DAILY
Qty: 180 TABLET | Refills: 1 | Status: CANCELLED | OUTPATIENT
Start: 2025-01-27 | End: 2025-07-26

## 2025-01-27 RX ORDER — ASPIRIN 81 MG/1
81 TABLET, CHEWABLE ORAL DAILY
Qty: 90 TABLET | Refills: 1 | Status: SHIPPED | OUTPATIENT
Start: 2025-01-27

## 2025-01-27 RX ORDER — ROSUVASTATIN CALCIUM 20 MG/1
20 TABLET, COATED ORAL DAILY
Qty: 90 TABLET | Refills: 0 | Status: CANCELLED | OUTPATIENT
Start: 2025-01-27

## 2025-01-27 RX ORDER — ROPINIROLE 3 MG/1
6 TABLET, FILM COATED ORAL NIGHTLY
Qty: 180 TABLET | Refills: 0 | Status: SHIPPED | OUTPATIENT
Start: 2025-01-27

## 2025-01-27 RX ORDER — PANTOPRAZOLE SODIUM 40 MG/1
40 TABLET, DELAYED RELEASE ORAL 2 TIMES DAILY
Qty: 180 TABLET | Refills: 1 | Status: SHIPPED | OUTPATIENT
Start: 2025-01-27 | End: 2025-07-26

## 2025-01-27 RX ORDER — CHOLECALCIFEROL (VITAMIN D3) 50 MCG
1 TABLET ORAL DAILY
Qty: 90 TABLET | Refills: 1 | Status: SHIPPED | OUTPATIENT
Start: 2025-01-27

## 2025-01-27 RX ORDER — CEFUROXIME AXETIL 250 MG/1
TABLET ORAL
Qty: 2 ML | Refills: 1 | Status: SHIPPED | OUTPATIENT
Start: 2025-01-27

## 2025-01-27 RX ORDER — ALBUTEROL SULFATE 90 UG/1
2 INHALANT RESPIRATORY (INHALATION) 4 TIMES DAILY PRN
Qty: 18 EACH | Refills: 3 | Status: CANCELLED | OUTPATIENT
Start: 2025-01-27

## 2025-01-27 RX ORDER — ROPINIROLE 3 MG/1
3 TABLET, FILM COATED ORAL NIGHTLY
Qty: 180 TABLET | Refills: 0 | Status: CANCELLED | OUTPATIENT
Start: 2025-01-27

## 2025-01-27 RX ORDER — RIMEGEPANT SULFATE 75 MG/75MG
75 TABLET, ORALLY DISINTEGRATING ORAL
Qty: 8 TABLET | Refills: 2 | Status: SHIPPED | OUTPATIENT
Start: 2025-01-27 | End: 2025-01-27

## 2025-01-27 RX ORDER — ISOSORBIDE MONONITRATE 60 MG/1
60 TABLET, EXTENDED RELEASE ORAL DAILY
Qty: 90 TABLET | Refills: 0 | Status: SHIPPED | OUTPATIENT
Start: 2025-01-27

## 2025-01-27 RX ORDER — HYDROXYZINE PAMOATE 50 MG/1
50 CAPSULE ORAL 4 TIMES DAILY PRN
Qty: 90 CAPSULE | Refills: 2 | Status: SHIPPED | OUTPATIENT
Start: 2025-01-27

## 2025-01-27 SDOH — ECONOMIC STABILITY: INCOME INSECURITY: IN THE LAST 12 MONTHS, WAS THERE A TIME WHEN YOU WERE NOT ABLE TO PAY THE MORTGAGE OR RENT ON TIME?: YES

## 2025-01-27 SDOH — ECONOMIC STABILITY: FOOD INSECURITY: WITHIN THE PAST 12 MONTHS, THE FOOD YOU BOUGHT JUST DIDN'T LAST AND YOU DIDN'T HAVE MONEY TO GET MORE.: NEVER TRUE

## 2025-01-27 SDOH — ECONOMIC STABILITY: FOOD INSECURITY: WITHIN THE PAST 12 MONTHS, YOU WORRIED THAT YOUR FOOD WOULD RUN OUT BEFORE YOU GOT MONEY TO BUY MORE.: NEVER TRUE

## 2025-01-27 SDOH — ECONOMIC STABILITY: TRANSPORTATION INSECURITY
IN THE PAST 12 MONTHS, HAS LACK OF TRANSPORTATION KEPT YOU FROM MEETINGS, WORK, OR FROM GETTING THINGS NEEDED FOR DAILY LIVING?: YES

## 2025-01-27 SDOH — ECONOMIC STABILITY: TRANSPORTATION INSECURITY
IN THE PAST 12 MONTHS, HAS THE LACK OF TRANSPORTATION KEPT YOU FROM MEDICAL APPOINTMENTS OR FROM GETTING MEDICATIONS?: YES

## 2025-01-27 ASSESSMENT — PATIENT HEALTH QUESTIONNAIRE - PHQ9
1. LITTLE INTEREST OR PLEASURE IN DOING THINGS: NOT AT ALL
8. MOVING OR SPEAKING SO SLOWLY THAT OTHER PEOPLE COULD HAVE NOTICED. OR THE OPPOSITE, BEING SO FIGETY OR RESTLESS THAT YOU HAVE BEEN MOVING AROUND A LOT MORE THAN USUAL: NOT AT ALL
7. TROUBLE CONCENTRATING ON THINGS, SUCH AS READING THE NEWSPAPER OR WATCHING TELEVISION: NOT AT ALL
2. FEELING DOWN, DEPRESSED OR HOPELESS: NOT AT ALL
4. FEELING TIRED OR HAVING LITTLE ENERGY: NEARLY EVERY DAY
5. POOR APPETITE OR OVEREATING: NOT AT ALL
1. LITTLE INTEREST OR PLEASURE IN DOING THINGS: NOT AT ALL
8. MOVING OR SPEAKING SO SLOWLY THAT OTHER PEOPLE COULD HAVE NOTICED. OR THE OPPOSITE - BEING SO FIDGETY OR RESTLESS THAT YOU HAVE BEEN MOVING AROUND A LOT MORE THAN USUAL: NOT AT ALL
6. FEELING BAD ABOUT YOURSELF - OR THAT YOU ARE A FAILURE OR HAVE LET YOURSELF OR YOUR FAMILY DOWN: NOT AT ALL
3. TROUBLE FALLING OR STAYING ASLEEP: NEARLY EVERY DAY
SUM OF ALL RESPONSES TO PHQ9 QUESTIONS 1 & 2: 0
SUM OF ALL RESPONSES TO PHQ QUESTIONS 1-9: 6
9. THOUGHTS THAT YOU WOULD BE BETTER OFF DEAD, OR OF HURTING YOURSELF: NOT AT ALL
SUM OF ALL RESPONSES TO PHQ QUESTIONS 1-9: 6
10. IF YOU CHECKED OFF ANY PROBLEMS, HOW DIFFICULT HAVE THESE PROBLEMS MADE IT FOR YOU TO DO YOUR WORK, TAKE CARE OF THINGS AT HOME, OR GET ALONG WITH OTHER PEOPLE: SOMEWHAT DIFFICULT
SUM OF ALL RESPONSES TO PHQ QUESTIONS 1-9: 6
4. FEELING TIRED OR HAVING LITTLE ENERGY: NEARLY EVERY DAY
10. IF YOU CHECKED OFF ANY PROBLEMS, HOW DIFFICULT HAVE THESE PROBLEMS MADE IT FOR YOU TO DO YOUR WORK, TAKE CARE OF THINGS AT HOME, OR GET ALONG WITH OTHER PEOPLE: SOMEWHAT DIFFICULT
6. FEELING BAD ABOUT YOURSELF - OR THAT YOU ARE A FAILURE OR HAVE LET YOURSELF OR YOUR FAMILY DOWN: NOT AT ALL
2. FEELING DOWN, DEPRESSED OR HOPELESS: NOT AT ALL
SUM OF ALL RESPONSES TO PHQ QUESTIONS 1-9: 6
7. TROUBLE CONCENTRATING ON THINGS, SUCH AS READING THE NEWSPAPER OR WATCHING TELEVISION: NOT AT ALL
9. THOUGHTS THAT YOU WOULD BE BETTER OFF DEAD, OR OF HURTING YOURSELF: NOT AT ALL
5. POOR APPETITE OR OVEREATING: NOT AT ALL
SUM OF ALL RESPONSES TO PHQ QUESTIONS 1-9: 6
3. TROUBLE FALLING OR STAYING ASLEEP: NEARLY EVERY DAY

## 2025-01-27 ASSESSMENT — ENCOUNTER SYMPTOMS
VOMITING: 0
NAUSEA: 0
SHORTNESS OF BREATH: 0
ABDOMINAL PAIN: 0

## 2025-02-28 DIAGNOSIS — F32.A ANXIETY AND DEPRESSION: ICD-10-CM

## 2025-02-28 DIAGNOSIS — F41.9 ANXIETY AND DEPRESSION: ICD-10-CM

## 2025-03-03 RX ORDER — QUETIAPINE FUMARATE 100 MG/1
100 TABLET, FILM COATED ORAL 2 TIMES DAILY
Qty: 180 TABLET | Refills: 1 | Status: SHIPPED | OUTPATIENT
Start: 2025-03-03

## 2025-03-07 DIAGNOSIS — I10 HYPERTENSION, UNSPECIFIED TYPE: ICD-10-CM

## 2025-03-07 RX ORDER — METOPROLOL SUCCINATE 50 MG/1
50 TABLET, EXTENDED RELEASE ORAL DAILY
Qty: 90 TABLET | Refills: 0 | Status: SHIPPED | OUTPATIENT
Start: 2025-03-07

## 2025-03-08 DIAGNOSIS — N32.81 OVERACTIVE BLADDER: ICD-10-CM

## 2025-03-09 DIAGNOSIS — N32.81 OVERACTIVE BLADDER: ICD-10-CM

## 2025-03-10 RX ORDER — OXYBUTYNIN CHLORIDE 15 MG/1
15 TABLET, EXTENDED RELEASE ORAL 2 TIMES DAILY
Qty: 180 TABLET | Refills: 1 | Status: SHIPPED | OUTPATIENT
Start: 2025-03-10 | End: 2025-03-10

## 2025-03-10 RX ORDER — OXYBUTYNIN CHLORIDE 15 MG/1
15 TABLET, EXTENDED RELEASE ORAL 2 TIMES DAILY
Qty: 180 TABLET | Refills: 0 | Status: SHIPPED | OUTPATIENT
Start: 2025-03-10

## 2025-03-13 DIAGNOSIS — I25.10 CORONARY ARTERY DISEASE INVOLVING NATIVE CORONARY ARTERY OF NATIVE HEART WITHOUT ANGINA PECTORIS: ICD-10-CM

## 2025-03-13 RX ORDER — ASPIRIN 81 MG
TABLET,CHEWABLE ORAL
Qty: 90 TABLET | Refills: 1 | Status: SHIPPED | OUTPATIENT
Start: 2025-03-13

## 2025-04-05 DIAGNOSIS — J45.20 MILD INTERMITTENT ASTHMA, UNSPECIFIED WHETHER COMPLICATED: ICD-10-CM

## 2025-04-07 RX ORDER — IPRATROPIUM BROMIDE AND ALBUTEROL 20; 100 UG/1; UG/1
SPRAY, METERED RESPIRATORY (INHALATION)
Qty: 4 G | Refills: 2 | Status: SHIPPED | OUTPATIENT
Start: 2025-04-07

## 2025-05-11 DIAGNOSIS — M54.2 CHRONIC NECK PAIN: ICD-10-CM

## 2025-05-11 DIAGNOSIS — G89.29 CHRONIC NECK PAIN: ICD-10-CM

## 2025-06-08 ENCOUNTER — APPOINTMENT (OUTPATIENT)
Dept: CT IMAGING | Age: 65
DRG: 140 | End: 2025-06-08
Payer: MEDICAID

## 2025-06-08 ENCOUNTER — HOSPITAL ENCOUNTER (EMERGENCY)
Age: 65
Discharge: HOME | DRG: 140 | End: 2025-06-08
Attending: EMERGENCY MEDICINE
Payer: MEDICAID

## 2025-06-08 VITALS
HEART RATE: 68 BPM | HEIGHT: 63 IN | TEMPERATURE: 98.6 F | DIASTOLIC BLOOD PRESSURE: 92 MMHG | OXYGEN SATURATION: 93 % | WEIGHT: 185 LBS | BODY MASS INDEX: 32.78 KG/M2 | SYSTOLIC BLOOD PRESSURE: 168 MMHG | RESPIRATION RATE: 18 BRPM

## 2025-06-08 DIAGNOSIS — J43.9 PULMONARY EMPHYSEMA, UNSPECIFIED EMPHYSEMA TYPE (HCC): Primary | ICD-10-CM

## 2025-06-08 LAB
ANION GAP SERPL CALCULATED.3IONS-SCNC: 12 MMOL/L (ref 9–16)
BASOPHILS # BLD: 0 K/UL (ref 0–0.2)
BASOPHILS NFR BLD: 1 % (ref 0–2)
BNP SERPL-MCNC: 274 PG/ML (ref 0–125)
BUN SERPL-MCNC: 15 MG/DL (ref 8–23)
CALCIUM SERPL-MCNC: 8.6 MG/DL (ref 8.6–10.4)
CHLORIDE SERPL-SCNC: 105 MMOL/L (ref 98–107)
CO2 SERPL-SCNC: 21 MMOL/L (ref 20–31)
CREAT SERPL-MCNC: 0.8 MG/DL (ref 0.6–0.9)
D DIMER PPP FEU-MCNC: 1.52 UG/ML FEU (ref 0–0.59)
EOSINOPHIL # BLD: 0.1 K/UL (ref 0–0.4)
EOSINOPHILS RELATIVE PERCENT: 1 % (ref 1–4)
ERYTHROCYTE [DISTWIDTH] IN BLOOD BY AUTOMATED COUNT: 16.1 % (ref 12.5–15.4)
GFR, ESTIMATED: 83 ML/MIN/1.73M2
GLUCOSE SERPL-MCNC: 131 MG/DL (ref 74–99)
HCT VFR BLD AUTO: 38.5 % (ref 36–46)
HGB BLD-MCNC: 12.3 G/DL (ref 12–16)
LYMPHOCYTES NFR BLD: 1 K/UL (ref 1–4.8)
LYMPHOCYTES RELATIVE PERCENT: 17 % (ref 24–44)
MCH RBC QN AUTO: 26.5 PG (ref 26–34)
MCHC RBC AUTO-ENTMCNC: 32 G/DL (ref 31–37)
MCV RBC AUTO: 82.9 FL (ref 80–100)
MONOCYTES NFR BLD: 0.4 K/UL (ref 0.1–1.2)
MONOCYTES NFR BLD: 8 % (ref 2–11)
NEUTROPHILS NFR BLD: 73 % (ref 36–66)
NEUTS SEG NFR BLD: 4.4 K/UL (ref 1.8–7.7)
PLATELET # BLD AUTO: 180 K/UL (ref 140–450)
PMV BLD AUTO: 8.2 FL (ref 6–12)
POTASSIUM SERPL-SCNC: 3.9 MMOL/L (ref 3.7–5.3)
RBC # BLD AUTO: 4.65 M/UL (ref 4–5.2)
SODIUM SERPL-SCNC: 138 MMOL/L (ref 136–145)
TROPONIN I SERPL HS-MCNC: <6 NG/L (ref 0–14)
WBC OTHER # BLD: 6 K/UL (ref 3.5–11)

## 2025-06-08 PROCEDURE — 6360000002 HC RX W HCPCS

## 2025-06-08 PROCEDURE — 85379 FIBRIN DEGRADATION QUANT: CPT

## 2025-06-08 PROCEDURE — 85025 COMPLETE CBC W/AUTO DIFF WBC: CPT

## 2025-06-08 PROCEDURE — 6360000002 HC RX W HCPCS: Performed by: EMERGENCY MEDICINE

## 2025-06-08 PROCEDURE — 71260 CT THORAX DX C+: CPT

## 2025-06-08 PROCEDURE — 93005 ELECTROCARDIOGRAM TRACING: CPT

## 2025-06-08 PROCEDURE — 2500000003 HC RX 250 WO HCPCS: Performed by: EMERGENCY MEDICINE

## 2025-06-08 PROCEDURE — 84484 ASSAY OF TROPONIN QUANT: CPT

## 2025-06-08 PROCEDURE — 83880 ASSAY OF NATRIURETIC PEPTIDE: CPT

## 2025-06-08 PROCEDURE — 94640 AIRWAY INHALATION TREATMENT: CPT

## 2025-06-08 PROCEDURE — 6360000004 HC RX CONTRAST MEDICATION: Performed by: EMERGENCY MEDICINE

## 2025-06-08 PROCEDURE — 80048 BASIC METABOLIC PNL TOTAL CA: CPT

## 2025-06-08 RX ORDER — ALBUTEROL SULFATE 0.83 MG/ML
2.5 SOLUTION RESPIRATORY (INHALATION) EVERY 6 HOURS PRN
Status: DISCONTINUED | OUTPATIENT
Start: 2025-06-08 | End: 2025-06-08 | Stop reason: HOSPADM

## 2025-06-08 RX ORDER — ALBUTEROL SULFATE 90 UG/1
2 INHALANT RESPIRATORY (INHALATION) 4 TIMES DAILY PRN
Qty: 18 G | Refills: 0 | Status: SHIPPED | OUTPATIENT
Start: 2025-06-08

## 2025-06-08 RX ORDER — 0.9 % SODIUM CHLORIDE 0.9 %
80 INTRAVENOUS SOLUTION INTRAVENOUS ONCE
Status: DISCONTINUED | OUTPATIENT
Start: 2025-06-08 | End: 2025-06-08 | Stop reason: HOSPADM

## 2025-06-08 RX ORDER — IPRATROPIUM BROMIDE AND ALBUTEROL SULFATE 2.5; .5 MG/3ML; MG/3ML
1 SOLUTION RESPIRATORY (INHALATION) EVERY 4 HOURS
Qty: 360 ML | Refills: 0 | Status: SHIPPED | OUTPATIENT
Start: 2025-06-08

## 2025-06-08 RX ORDER — ONDANSETRON 4 MG/1
4 TABLET, FILM COATED ORAL 3 TIMES DAILY PRN
Qty: 15 TABLET | Refills: 0 | Status: SHIPPED | OUTPATIENT
Start: 2025-06-08

## 2025-06-08 RX ORDER — SODIUM CHLORIDE 0.9 % (FLUSH) 0.9 %
10 SYRINGE (ML) INJECTION PRN
Status: DISCONTINUED | OUTPATIENT
Start: 2025-06-08 | End: 2025-06-08 | Stop reason: HOSPADM

## 2025-06-08 RX ORDER — METHYLPREDNISOLONE SODIUM SUCCINATE 125 MG/2ML
125 INJECTION INTRAMUSCULAR; INTRAVENOUS ONCE
Status: DISCONTINUED | OUTPATIENT
Start: 2025-06-08 | End: 2025-06-08 | Stop reason: RX

## 2025-06-08 RX ORDER — PREDNISONE 50 MG/1
50 TABLET ORAL DAILY
Qty: 5 TABLET | Refills: 0 | Status: ON HOLD | OUTPATIENT
Start: 2025-06-08 | End: 2025-06-10

## 2025-06-08 RX ORDER — IOPAMIDOL 755 MG/ML
75 INJECTION, SOLUTION INTRAVASCULAR
Status: COMPLETED | OUTPATIENT
Start: 2025-06-08 | End: 2025-06-08

## 2025-06-08 RX ADMIN — Medication 80 ML: at 15:09

## 2025-06-08 RX ADMIN — ALBUTEROL SULFATE 2.5 MG: 2.5 SOLUTION RESPIRATORY (INHALATION) at 14:38

## 2025-06-08 RX ADMIN — METHYLPREDNISOLONE SODIUM SUCCINATE 125 MG: 125 INJECTION, POWDER, FOR SOLUTION INTRAMUSCULAR; INTRAVENOUS at 14:00

## 2025-06-08 RX ADMIN — IOPAMIDOL 75 ML: 755 INJECTION, SOLUTION INTRAVENOUS at 15:08

## 2025-06-08 RX ADMIN — SODIUM CHLORIDE, PRESERVATIVE FREE 10 ML: 5 INJECTION INTRAVENOUS at 15:09

## 2025-06-08 NOTE — DISCHARGE INSTRUCTIONS
Take medications as prescribed, use the DuoNeb 3 times a day take the prednisone daily follow-up closely with PCP.  You can replace the nebulizer with albuterol as your symptoms are improving.  Follow-up closely with primary care in the next couple days.  If you develop worsening shortness of breath you need to return to the hospital    Please understand that at this time there is no evidence for a more serious underlying process, but that early in the process of an illness or injury, an emergency department workup can be falsely reassuring.  You should contact your family doctor within the next 48 hours for a follow up appointment    THANK YOU!!!    From [facility] and [facility]    On behalf of the Emergency Department staff at [facility], I would like to thank you for giving us the opportunity to address your health care needs and concerns.    We hope that during your visit, our service was delivered in a professional and caring manner. Please keep [facility] in mind as we walk with you down the path to your own personal wellness.     Please expect an automated text message or email from us so we can ask a few questions about your health and progress. Based on your answers, a clinician may call you back to offer help and instructions.    Please understand that early in the process of an illness or injury, an emergency department workup can be falsely reassuring.  If you notice any worsening, changing or persistent symptoms please call your family doctor or return to the ER immediately.     Tell us how we did during your visit at [email or web address]   and let us know about your experience

## 2025-06-09 ENCOUNTER — HOSPITAL ENCOUNTER (INPATIENT)
Age: 65
LOS: 1 days | Discharge: HOME | DRG: 140 | End: 2025-06-10
Attending: STUDENT IN AN ORGANIZED HEALTH CARE EDUCATION/TRAINING PROGRAM | Admitting: HOSPITALIST
Payer: MEDICAID

## 2025-06-09 ENCOUNTER — APPOINTMENT (OUTPATIENT)
Dept: GENERAL RADIOLOGY | Age: 65
DRG: 140 | End: 2025-06-09
Payer: MEDICAID

## 2025-06-09 DIAGNOSIS — R09.02 HYPOXEMIA: ICD-10-CM

## 2025-06-09 DIAGNOSIS — J44.1 COPD EXACERBATION (HCC): Primary | ICD-10-CM

## 2025-06-09 LAB
ALLEN TEST: ABNORMAL
ANION GAP SERPL CALCULATED.3IONS-SCNC: 12 MMOL/L (ref 9–16)
B PARAP IS1001 DNA NPH QL NAA+NON-PROBE: NOT DETECTED
B PERT DNA SPEC QL NAA+PROBE: NOT DETECTED
BACTERIA URNS QL MICRO: ABNORMAL
BASOPHILS # BLD: 0.01 K/UL (ref 0–0.2)
BASOPHILS NFR BLD: 0 % (ref 0–2)
BILIRUB UR QL STRIP: NEGATIVE
BNP SERPL-MCNC: 1050 PG/ML (ref 0–125)
BUN SERPL-MCNC: 15 MG/DL (ref 8–23)
C PNEUM DNA NPH QL NAA+NON-PROBE: NOT DETECTED
CALCIUM SERPL-MCNC: 8.6 MG/DL (ref 8.6–10.4)
CHARACTER UR: ABNORMAL
CHLORIDE SERPL-SCNC: 105 MMOL/L (ref 98–107)
CLARITY UR: CLEAR
CO2 SERPL-SCNC: 23 MMOL/L (ref 20–31)
COLOR UR: YELLOW
CREAT SERPL-MCNC: 0.8 MG/DL (ref 0.6–0.9)
EKG ATRIAL RATE: 72 BPM
EKG P AXIS: 83 DEGREES
EKG P-R INTERVAL: 146 MS
EKG Q-T INTERVAL: 390 MS
EKG QRS DURATION: 88 MS
EKG QTC CALCULATION (BAZETT): 427 MS
EKG R AXIS: 91 DEGREES
EKG T AXIS: 63 DEGREES
EKG VENTRICULAR RATE: 72 BPM
EOSINOPHIL # BLD: 0 K/UL (ref 0–0.4)
EOSINOPHILS RELATIVE PERCENT: 0 % (ref 1–4)
EPI CELLS #/AREA URNS HPF: ABNORMAL /HPF (ref 0–5)
ERYTHROCYTE [DISTWIDTH] IN BLOOD BY AUTOMATED COUNT: 16.7 % (ref 12.5–15.4)
FLUAV RNA NPH QL NAA+NON-PROBE: NOT DETECTED
FLUBV RNA NPH QL NAA+NON-PROBE: NOT DETECTED
GFR, ESTIMATED: 83 ML/MIN/1.73M2
GLUCOSE SERPL-MCNC: 179 MG/DL (ref 74–99)
GLUCOSE UR STRIP-MCNC: NEGATIVE MG/DL
HADV DNA NPH QL NAA+NON-PROBE: NOT DETECTED
HCO3 VENOUS: 30.2 MMOL/L (ref 22–29)
HCOV 229E RNA NPH QL NAA+NON-PROBE: NOT DETECTED
HCOV HKU1 RNA NPH QL NAA+NON-PROBE: NOT DETECTED
HCOV NL63 RNA NPH QL NAA+NON-PROBE: NOT DETECTED
HCOV OC43 RNA NPH QL NAA+NON-PROBE: NOT DETECTED
HCT VFR BLD AUTO: 37.7 % (ref 36–46)
HGB BLD-MCNC: 12.4 G/DL (ref 12–16)
HGB UR QL STRIP.AUTO: ABNORMAL
HMPV RNA NPH QL NAA+NON-PROBE: NOT DETECTED
HPIV1 RNA NPH QL NAA+NON-PROBE: NOT DETECTED
HPIV2 RNA NPH QL NAA+NON-PROBE: NOT DETECTED
HPIV3 RNA NPH QL NAA+NON-PROBE: DETECTED
HPIV4 RNA NPH QL NAA+NON-PROBE: NOT DETECTED
KETONES UR STRIP-MCNC: NEGATIVE MG/DL
LACTATE BLDV-SCNC: 1.1 MMOL/L (ref 0.5–2.2)
LEUKOCYTE ESTERASE UR QL STRIP: ABNORMAL
LYMPHOCYTES NFR BLD: 0.78 K/UL (ref 1–4.8)
LYMPHOCYTES RELATIVE PERCENT: 12 % (ref 24–44)
M PNEUMO DNA NPH QL NAA+NON-PROBE: NOT DETECTED
MCH RBC QN AUTO: 27.4 PG (ref 26–34)
MCHC RBC AUTO-ENTMCNC: 32.9 G/DL (ref 31–37)
MCV RBC AUTO: 83.4 FL (ref 80–100)
MONOCYTES NFR BLD: 0.21 K/UL (ref 0.1–1.2)
MONOCYTES NFR BLD: 3 % (ref 2–11)
NEUTROPHILS NFR BLD: 85 % (ref 36–66)
NEUTS SEG NFR BLD: 5.68 K/UL (ref 1.8–7.7)
NITRITE UR QL STRIP: NEGATIVE
O2 SAT, VEN: 88.2 % (ref 60–85)
PCO2 VENOUS: 60.4 MM HG (ref 41–51)
PH UR STRIP: 6 [PH] (ref 5–8)
PH VENOUS: 7.31 (ref 7.32–7.43)
PLATELET # BLD AUTO: 180 K/UL (ref 140–450)
PMV BLD AUTO: 10.9 FL (ref 8–14)
PO2 VENOUS: 61.9 MM HG (ref 30–50)
POSITIVE BASE EXCESS, VEN: 2.4 MMOL/L (ref 0–3)
POTASSIUM SERPL-SCNC: 4.3 MMOL/L (ref 3.7–5.3)
PROCALCITONIN SERPL-MCNC: 0.07 NG/ML (ref 0–0.09)
PROT UR STRIP-MCNC: NEGATIVE MG/DL
RBC # BLD AUTO: 4.52 M/UL (ref 4–5.2)
RBC #/AREA URNS HPF: ABNORMAL /HPF (ref 0–2)
RSV RNA NPH QL NAA+NON-PROBE: NOT DETECTED
RV+EV RNA NPH QL NAA+NON-PROBE: NOT DETECTED
SARS-COV-2 RNA NPH QL NAA+NON-PROBE: NOT DETECTED
SODIUM SERPL-SCNC: 140 MMOL/L (ref 136–145)
SP GR UR STRIP: 1.01 (ref 1–1.03)
SPECIMEN DESCRIPTION: ABNORMAL
TROPONIN I SERPL HS-MCNC: 11 NG/L (ref 0–14)
TROPONIN I SERPL HS-MCNC: 11 NG/L (ref 0–14)
UROBILINOGEN UR STRIP-ACNC: NORMAL EU/DL (ref 0–1)
WBC #/AREA URNS HPF: ABNORMAL /HPF (ref 0–5)
WBC OTHER # BLD: 6.7 K/UL (ref 3.5–11)

## 2025-06-09 PROCEDURE — 81001 URINALYSIS AUTO W/SCOPE: CPT

## 2025-06-09 PROCEDURE — 6370000000 HC RX 637 (ALT 250 FOR IP)

## 2025-06-09 PROCEDURE — 2580000003 HC RX 258: Performed by: STUDENT IN AN ORGANIZED HEALTH CARE EDUCATION/TRAINING PROGRAM

## 2025-06-09 PROCEDURE — 93005 ELECTROCARDIOGRAM TRACING: CPT | Performed by: STUDENT IN AN ORGANIZED HEALTH CARE EDUCATION/TRAINING PROGRAM

## 2025-06-09 PROCEDURE — 6360000002 HC RX W HCPCS

## 2025-06-09 PROCEDURE — 6370000000 HC RX 637 (ALT 250 FOR IP): Performed by: HOSPITALIST

## 2025-06-09 PROCEDURE — 94640 AIRWAY INHALATION TREATMENT: CPT

## 2025-06-09 PROCEDURE — 36415 COLL VENOUS BLD VENIPUNCTURE: CPT

## 2025-06-09 PROCEDURE — 97116 GAIT TRAINING THERAPY: CPT

## 2025-06-09 PROCEDURE — 99222 1ST HOSP IP/OBS MODERATE 55: CPT | Performed by: HOSPITALIST

## 2025-06-09 PROCEDURE — 99285 EMERGENCY DEPT VISIT HI MDM: CPT

## 2025-06-09 PROCEDURE — 80048 BASIC METABOLIC PNL TOTAL CA: CPT

## 2025-06-09 PROCEDURE — APPSS45 APP SPLIT SHARED TIME 31-45 MINUTES

## 2025-06-09 PROCEDURE — 82803 BLOOD GASES ANY COMBINATION: CPT

## 2025-06-09 PROCEDURE — 83880 ASSAY OF NATRIURETIC PEPTIDE: CPT

## 2025-06-09 PROCEDURE — 2500000003 HC RX 250 WO HCPCS

## 2025-06-09 PROCEDURE — 83605 ASSAY OF LACTIC ACID: CPT

## 2025-06-09 PROCEDURE — 0202U NFCT DS 22 TRGT SARS-COV-2: CPT

## 2025-06-09 PROCEDURE — 6370000000 HC RX 637 (ALT 250 FOR IP): Performed by: STUDENT IN AN ORGANIZED HEALTH CARE EDUCATION/TRAINING PROGRAM

## 2025-06-09 PROCEDURE — 84484 ASSAY OF TROPONIN QUANT: CPT

## 2025-06-09 PROCEDURE — 97161 PT EVAL LOW COMPLEX 20 MIN: CPT

## 2025-06-09 PROCEDURE — 97165 OT EVAL LOW COMPLEX 30 MIN: CPT

## 2025-06-09 PROCEDURE — 71045 X-RAY EXAM CHEST 1 VIEW: CPT

## 2025-06-09 PROCEDURE — 84145 PROCALCITONIN (PCT): CPT

## 2025-06-09 PROCEDURE — 85025 COMPLETE CBC W/AUTO DIFF WBC: CPT

## 2025-06-09 PROCEDURE — 2060000000 HC ICU INTERMEDIATE R&B

## 2025-06-09 RX ORDER — IPRATROPIUM BROMIDE AND ALBUTEROL SULFATE 2.5; .5 MG/3ML; MG/3ML
1 SOLUTION RESPIRATORY (INHALATION)
Status: DISCONTINUED | OUTPATIENT
Start: 2025-06-09 | End: 2025-06-09

## 2025-06-09 RX ORDER — IBUPROFEN 800 MG/1
800 TABLET, FILM COATED ORAL EVERY 8 HOURS PRN
Status: DISCONTINUED | OUTPATIENT
Start: 2025-06-09 | End: 2025-06-10 | Stop reason: HOSPADM

## 2025-06-09 RX ORDER — QUETIAPINE FUMARATE 100 MG/1
100 TABLET, FILM COATED ORAL 2 TIMES DAILY
Status: DISCONTINUED | OUTPATIENT
Start: 2025-06-09 | End: 2025-06-10 | Stop reason: HOSPADM

## 2025-06-09 RX ORDER — ENOXAPARIN SODIUM 100 MG/ML
40 INJECTION SUBCUTANEOUS DAILY
Status: DISCONTINUED | OUTPATIENT
Start: 2025-06-09 | End: 2025-06-10 | Stop reason: HOSPADM

## 2025-06-09 RX ORDER — OXYBUTYNIN CHLORIDE 5 MG/1
15 TABLET, EXTENDED RELEASE ORAL 2 TIMES DAILY
Status: DISCONTINUED | OUTPATIENT
Start: 2025-06-09 | End: 2025-06-10 | Stop reason: HOSPADM

## 2025-06-09 RX ORDER — ACETAMINOPHEN 325 MG/1
650 TABLET ORAL EVERY 6 HOURS PRN
Status: DISCONTINUED | OUTPATIENT
Start: 2025-06-09 | End: 2025-06-10 | Stop reason: HOSPADM

## 2025-06-09 RX ORDER — VITAMIN B COMPLEX
2000 TABLET ORAL DAILY
Status: DISCONTINUED | OUTPATIENT
Start: 2025-06-09 | End: 2025-06-10 | Stop reason: HOSPADM

## 2025-06-09 RX ORDER — ISOSORBIDE MONONITRATE 60 MG/1
60 TABLET, EXTENDED RELEASE ORAL DAILY
Status: DISCONTINUED | OUTPATIENT
Start: 2025-06-09 | End: 2025-06-10 | Stop reason: HOSPADM

## 2025-06-09 RX ORDER — 0.9 % SODIUM CHLORIDE 0.9 %
1000 INTRAVENOUS SOLUTION INTRAVENOUS ONCE
Status: COMPLETED | OUTPATIENT
Start: 2025-06-09 | End: 2025-06-09

## 2025-06-09 RX ORDER — GUAIFENESIN 600 MG/1
600 TABLET, EXTENDED RELEASE ORAL 2 TIMES DAILY
Status: DISCONTINUED | OUTPATIENT
Start: 2025-06-09 | End: 2025-06-10 | Stop reason: HOSPADM

## 2025-06-09 RX ORDER — KETOROLAC TROMETHAMINE 15 MG/ML
15 INJECTION, SOLUTION INTRAMUSCULAR; INTRAVENOUS ONCE
Status: COMPLETED | OUTPATIENT
Start: 2025-06-09 | End: 2025-06-09

## 2025-06-09 RX ORDER — SUMATRIPTAN 50 MG/1
100 TABLET, FILM COATED ORAL ONCE
Status: COMPLETED | OUTPATIENT
Start: 2025-06-09 | End: 2025-06-09

## 2025-06-09 RX ORDER — SODIUM CHLORIDE 9 MG/ML
INJECTION, SOLUTION INTRAVENOUS PRN
Status: DISCONTINUED | OUTPATIENT
Start: 2025-06-09 | End: 2025-06-10 | Stop reason: HOSPADM

## 2025-06-09 RX ORDER — SODIUM CHLORIDE 0.9 % (FLUSH) 0.9 %
5-40 SYRINGE (ML) INJECTION EVERY 12 HOURS SCHEDULED
Status: DISCONTINUED | OUTPATIENT
Start: 2025-06-09 | End: 2025-06-10 | Stop reason: HOSPADM

## 2025-06-09 RX ORDER — ASPIRIN 81 MG/1
81 TABLET, CHEWABLE ORAL DAILY
Status: DISCONTINUED | OUTPATIENT
Start: 2025-06-09 | End: 2025-06-10 | Stop reason: HOSPADM

## 2025-06-09 RX ORDER — SODIUM CHLORIDE 0.9 % (FLUSH) 0.9 %
5-40 SYRINGE (ML) INJECTION PRN
Status: DISCONTINUED | OUTPATIENT
Start: 2025-06-09 | End: 2025-06-10 | Stop reason: HOSPADM

## 2025-06-09 RX ORDER — METOPROLOL SUCCINATE 50 MG/1
50 TABLET, EXTENDED RELEASE ORAL DAILY
Status: DISCONTINUED | OUTPATIENT
Start: 2025-06-09 | End: 2025-06-10 | Stop reason: HOSPADM

## 2025-06-09 RX ORDER — POLYETHYLENE GLYCOL 3350 17 G/17G
17 POWDER, FOR SOLUTION ORAL DAILY PRN
Status: DISCONTINUED | OUTPATIENT
Start: 2025-06-09 | End: 2025-06-10 | Stop reason: HOSPADM

## 2025-06-09 RX ORDER — IPRATROPIUM BROMIDE AND ALBUTEROL SULFATE 2.5; .5 MG/3ML; MG/3ML
1 SOLUTION RESPIRATORY (INHALATION) ONCE
Status: COMPLETED | OUTPATIENT
Start: 2025-06-09 | End: 2025-06-09

## 2025-06-09 RX ORDER — ACETAMINOPHEN 650 MG/1
650 SUPPOSITORY RECTAL EVERY 6 HOURS PRN
Status: DISCONTINUED | OUTPATIENT
Start: 2025-06-09 | End: 2025-06-10 | Stop reason: HOSPADM

## 2025-06-09 RX ORDER — METHYLPREDNISOLONE SODIUM SUCCINATE 40 MG/ML
40 INJECTION INTRAMUSCULAR; INTRAVENOUS EVERY 8 HOURS
Status: DISCONTINUED | OUTPATIENT
Start: 2025-06-09 | End: 2025-06-10 | Stop reason: HOSPADM

## 2025-06-09 RX ORDER — ONDANSETRON 2 MG/ML
4 INJECTION INTRAMUSCULAR; INTRAVENOUS EVERY 6 HOURS PRN
Status: DISCONTINUED | OUTPATIENT
Start: 2025-06-09 | End: 2025-06-10 | Stop reason: HOSPADM

## 2025-06-09 RX ORDER — WATER 10 ML/10ML
INJECTION INTRAMUSCULAR; INTRAVENOUS; SUBCUTANEOUS
Status: DISPENSED
Start: 2025-06-09 | End: 2025-06-09

## 2025-06-09 RX ORDER — HYDROXYZINE HYDROCHLORIDE 25 MG/1
50 TABLET, FILM COATED ORAL 4 TIMES DAILY PRN
Status: DISCONTINUED | OUTPATIENT
Start: 2025-06-09 | End: 2025-06-10 | Stop reason: HOSPADM

## 2025-06-09 RX ORDER — ROSUVASTATIN CALCIUM 20 MG/1
20 TABLET, COATED ORAL DAILY
Status: DISCONTINUED | OUTPATIENT
Start: 2025-06-09 | End: 2025-06-10 | Stop reason: HOSPADM

## 2025-06-09 RX ORDER — IPRATROPIUM BROMIDE AND ALBUTEROL SULFATE 2.5; .5 MG/3ML; MG/3ML
1 SOLUTION RESPIRATORY (INHALATION)
Status: DISCONTINUED | OUTPATIENT
Start: 2025-06-09 | End: 2025-06-10 | Stop reason: HOSPADM

## 2025-06-09 RX ORDER — IPRATROPIUM BROMIDE AND ALBUTEROL SULFATE 2.5; .5 MG/3ML; MG/3ML
1 SOLUTION RESPIRATORY (INHALATION) EVERY 4 HOURS PRN
Status: DISCONTINUED | OUTPATIENT
Start: 2025-06-09 | End: 2025-06-09

## 2025-06-09 RX ORDER — ROPINIROLE 1 MG/1
6 TABLET, FILM COATED ORAL NIGHTLY
Status: DISCONTINUED | OUTPATIENT
Start: 2025-06-09 | End: 2025-06-10 | Stop reason: HOSPADM

## 2025-06-09 RX ORDER — TOPIRAMATE 25 MG/1
50 TABLET, FILM COATED ORAL DAILY
Status: DISCONTINUED | OUTPATIENT
Start: 2025-06-09 | End: 2025-06-10 | Stop reason: HOSPADM

## 2025-06-09 RX ORDER — PANTOPRAZOLE SODIUM 40 MG/1
40 TABLET, DELAYED RELEASE ORAL 2 TIMES DAILY
Status: DISCONTINUED | OUTPATIENT
Start: 2025-06-09 | End: 2025-06-10 | Stop reason: HOSPADM

## 2025-06-09 RX ORDER — PROMETHAZINE HYDROCHLORIDE 25 MG/1
12.5 TABLET ORAL EVERY 6 HOURS PRN
Status: DISCONTINUED | OUTPATIENT
Start: 2025-06-09 | End: 2025-06-10 | Stop reason: HOSPADM

## 2025-06-09 RX ORDER — ALBUTEROL SULFATE 0.83 MG/ML
2.5 SOLUTION RESPIRATORY (INHALATION)
Status: DISCONTINUED | OUTPATIENT
Start: 2025-06-09 | End: 2025-06-10 | Stop reason: HOSPADM

## 2025-06-09 RX ADMIN — IPRATROPIUM BROMIDE AND ALBUTEROL SULFATE 1 DOSE: .5; 2.5 SOLUTION RESPIRATORY (INHALATION) at 07:51

## 2025-06-09 RX ADMIN — ALBUTEROL SULFATE 2.5 MG: 2.5 SOLUTION RESPIRATORY (INHALATION) at 23:25

## 2025-06-09 RX ADMIN — SODIUM CHLORIDE, PRESERVATIVE FREE 10 ML: 5 INJECTION INTRAVENOUS at 15:26

## 2025-06-09 RX ADMIN — ONDANSETRON 4 MG: 2 INJECTION, SOLUTION INTRAMUSCULAR; INTRAVENOUS at 19:36

## 2025-06-09 RX ADMIN — METHYLPREDNISOLONE SODIUM SUCCINATE 40 MG: 40 INJECTION, POWDER, LYOPHILIZED, FOR SOLUTION INTRAMUSCULAR; INTRAVENOUS at 17:43

## 2025-06-09 RX ADMIN — Medication 2000 UNITS: at 12:16

## 2025-06-09 RX ADMIN — KETOROLAC TROMETHAMINE 15 MG: 15 INJECTION, SOLUTION INTRAMUSCULAR; INTRAVENOUS at 05:56

## 2025-06-09 RX ADMIN — METHYLPREDNISOLONE SODIUM SUCCINATE 40 MG: 40 INJECTION, POWDER, LYOPHILIZED, FOR SOLUTION INTRAMUSCULAR; INTRAVENOUS at 10:04

## 2025-06-09 RX ADMIN — PANTOPRAZOLE SODIUM 40 MG: 40 TABLET, DELAYED RELEASE ORAL at 21:44

## 2025-06-09 RX ADMIN — QUETIAPINE FUMARATE 100 MG: 100 TABLET ORAL at 12:16

## 2025-06-09 RX ADMIN — GUAIFENESIN 600 MG: 600 TABLET, EXTENDED RELEASE ORAL at 10:03

## 2025-06-09 RX ADMIN — QUETIAPINE FUMARATE 100 MG: 100 TABLET ORAL at 21:44

## 2025-06-09 RX ADMIN — ROPINIROLE HYDROCHLORIDE 6 MG: 1 TABLET, FILM COATED ORAL at 21:43

## 2025-06-09 RX ADMIN — ENOXAPARIN SODIUM 40 MG: 100 INJECTION SUBCUTANEOUS at 10:04

## 2025-06-09 RX ADMIN — ACETAMINOPHEN 650 MG: 325 TABLET ORAL at 23:22

## 2025-06-09 RX ADMIN — TOPIRAMATE 50 MG: 25 TABLET, FILM COATED ORAL at 12:16

## 2025-06-09 RX ADMIN — ROSUVASTATIN CALCIUM 20 MG: 20 TABLET, FILM COATED ORAL at 12:16

## 2025-06-09 RX ADMIN — IPRATROPIUM BROMIDE AND ALBUTEROL SULFATE 1 DOSE: .5; 2.5 SOLUTION RESPIRATORY (INHALATION) at 03:24

## 2025-06-09 RX ADMIN — IPRATROPIUM BROMIDE AND ALBUTEROL SULFATE 1 DOSE: .5; 2.5 SOLUTION RESPIRATORY (INHALATION) at 11:15

## 2025-06-09 RX ADMIN — ASPIRIN 81 MG: 81 TABLET, CHEWABLE ORAL at 10:03

## 2025-06-09 RX ADMIN — IPRATROPIUM BROMIDE AND ALBUTEROL SULFATE 1 DOSE: .5; 2.5 SOLUTION RESPIRATORY (INHALATION) at 19:48

## 2025-06-09 RX ADMIN — SODIUM CHLORIDE, PRESERVATIVE FREE 10 ML: 5 INJECTION INTRAVENOUS at 21:44

## 2025-06-09 RX ADMIN — SUMATRIPTAN SUCCINATE 100 MG: 50 TABLET ORAL at 12:16

## 2025-06-09 RX ADMIN — IPRATROPIUM BROMIDE AND ALBUTEROL SULFATE 1 DOSE: .5; 2.5 SOLUTION RESPIRATORY (INHALATION) at 03:28

## 2025-06-09 RX ADMIN — SODIUM CHLORIDE 1000 ML: 0.9 INJECTION, SOLUTION INTRAVENOUS at 03:48

## 2025-06-09 RX ADMIN — OXYBUTYNIN CHLORIDE 15 MG: 5 TABLET, EXTENDED RELEASE ORAL at 21:43

## 2025-06-09 RX ADMIN — PANTOPRAZOLE SODIUM 40 MG: 40 TABLET, DELAYED RELEASE ORAL at 10:03

## 2025-06-09 RX ADMIN — GUAIFENESIN 600 MG: 600 TABLET, EXTENDED RELEASE ORAL at 21:43

## 2025-06-09 RX ADMIN — OXYBUTYNIN CHLORIDE 15 MG: 5 TABLET, EXTENDED RELEASE ORAL at 12:16

## 2025-06-09 ASSESSMENT — PAIN DESCRIPTION - LOCATION: LOCATION: HEAD

## 2025-06-09 ASSESSMENT — ENCOUNTER SYMPTOMS
EYES NEGATIVE: 1
SHORTNESS OF BREATH: 1
ALLERGIC/IMMUNOLOGIC NEGATIVE: 1
ABDOMINAL PAIN: 0
COUGH: 1
VOMITING: 0
CONSTIPATION: 0
NAUSEA: 0
WHEEZING: 1
DIARRHEA: 0

## 2025-06-09 ASSESSMENT — PAIN DESCRIPTION - DESCRIPTORS: DESCRIPTORS: ACHING

## 2025-06-09 ASSESSMENT — PAIN SCALES - GENERAL
PAINLEVEL_OUTOF10: 5
PAINLEVEL_OUTOF10: 0

## 2025-06-09 NOTE — H&P
[facility]  Office: [phone]  Kayode Quesada, DO, Wilmer Pringle, DO, Felipe Zazueta DO, Brandon Patel, DO, Kike Mulligan MD, Wen Borden MD, Gabe Talbert MD, Alexandra Mas MD,  Chris Izquierdo MD, Imtiaz Moreno MD, Rajesh Greene MD,  Teresita Emmanuel DO, Zaynab Qureshi MD, Bharathi Fam MD, Quintin Quesada DO, Soheila Schrader MD,  Mayo Fuller DO, Mayte Vera MD, Heydi Zepeda MD, Leia Christie MD,  Juan Pablo Landry MD, Liz Maher MD, Anita Bains MD, Sarah Kent MD, Stephon Fagan MD, Jann Chambers MD, John Paul Syed, DO, Nia Zuñiga MD, Mamie Zamora DO, Joseph Kay MD, Teresita Sparrow MD, Mohsin Reza, MD, Ashly Roberts MD, Shirley Waterhouse, CNP,  Kalyn Esparza, CNP, John Paul Mackey, CNP,  Tiffany Rosales, CLEMENT, Kristel Stevens, CNP, Flores Juan, CNP, Jia Poe, CNP, Chelo De Jesus, CNP, Mei Murillo, PA-C, Khushi Howell, CNP, Charu Sen, CNP,  Muna Estes, CNP, Laura Sanchez, CNP, Isaiah Tate, PA-C, Ericka Olivera, CNP,  Daisy Young, CNS, Kyra Olsen, CNP, Elyssa Bryson, CNP,   Denita Rivera, CNP         [facility]   IN-PATIENT SERVICE   [facility]    HISTORY AND PHYSICAL EXAMINATION            Date:   6/9/2025  Patient name:  Digna Guaman  Date of admission:  6/9/2025  3:17 AM  MRN:   [id number]  Account:  [id number]  YOB: 1960  PCP:    Husam Chapin DO  Room:   ER04/ER04  Code Status:    Prior    Chief Complaint:     Chief Complaint   Patient presents with    Shortness of Breath     Via EMS for SOB. PT reports to experiencing SOB. She reports she was seen in the ED this morning and discharged home. HX of COPD.        History Obtained From:     patient, electronic medical record    History of Present Illness:     Digna Guaman is a 64 y.o. Non- / non  female who presents with Shortness of Breath (Via EMS for SOB. PT reports to experiencing SOB. She reports she was seen in the ED this

## 2025-06-09 NOTE — ED PROVIDER NOTES
[facility] Emergency Department  [address]  [facility] [address]  Phone: [phone]  Fax: [phone]      Attending Physician Attestation    Based on the medical record, the care appears appropriate. I was personally available for consultation in the Emergency Department. I did have a discussion with our midlevel provider regarding the care of this patient.  I reviewed the mid level provider's note and agree with the documented findings and plan of care.   I have reviewed the emergency nurses triage note. I agree with the chief complaint, past medical history, past surgical history, allergies, medications, social and family history as documented unless otherwise noted below.       CHIEF COMPLAINT       Chief Complaint   Patient presents with    Shortness of Breath     Shortness of breath for 3 days.  Loose cough.          PAST MEDICAL HISTORY    has a past medical history of Anxiety and depression, Asthma, Back injury, CAD (coronary artery disease), Cataracts, bilateral, Chronic back pain, Gastroesophageal reflux disease without esophagitis, H/O blood clots, Headache(784.0), Heart attack (HCC), Hypertension, Knee injury, Osteoarthritis, Psoriasis, and PTSD (post-traumatic stress disorder).    SURGICAL HISTORY      has a past surgical history that includes knee surgery; Elbow surgery; Cervix surgery (1997/1998); LEEP; Nerve Block (2018); and joint replacement (Right, 10/28/2020).    CURRENT MEDICATIONS       Discharge Medication List as of 6/8/2025  4:51 PM        CONTINUE these medications which have NOT CHANGED    Details   tiZANidine (ZANAFLEX) 4 MG tablet TAKE 1 AND 1/2 TABLETS BY MOUTH THREE TIMES DAILY AS NEEDED FOR MUSCLE TIGHTNESS, Disp-30 tablet, R-2Normal      COMBIVENT RESPIMAT  MCG/ACT AERS inhaler INHALE 1 PUFF INTO THE LUNGS EVERY 6 HOURS, Disp-4 g, R-2Normal      ASPIRIN LOW DOSE 81 MG chewable tablet CHEW & SWALLOW 1 TABLET BY MOUTH DAILY, Disp-90 tablet, R-1Normal    
disorder).    SURGICAL HISTORY:      has a past surgical history that includes knee surgery; Elbow surgery; Cervix surgery (1997/1998); LEEP; Nerve Block (2018); and joint replacement (Right, 10/28/2020).    FAMILY HISTORY:   She indicated that the status of her mother is unknown. She indicated that the status of her maternal grandmother is unknown. She indicated that the status of her maternal grandfather is unknown.     family history includes Arthritis in her maternal grandmother and mother; Heart Disease in her maternal grandfather and mother; High Blood Pressure in her mother; Stroke in her maternal grandfather.    SOCIAL HISTORY:     reports that she has been smoking cigarettes. She started smoking about 54 years ago. She has a 98.5 pack-year smoking history. She has never used smokeless tobacco. She reports that she does not drink alcohol and does not use drugs.    IMMUNIZATION HISTORY:      Immunization History   Administered Date(s) Administered    COVID-19, MODERNA Bivalent, (age 12y+), IM, 50 mcg/0.5 mL 11/08/2022    COVID-19, US Vaccine, Vaccine Unspecified 12/08/2021, 01/05/2022, 06/10/2022    Influenza Virus Vaccine 10/13/2015, 10/07/2016, 10/21/2019, 10/27/2020    Influenza, FLUARIX, FLULAVAL, FLUZONE (age 6 mo+) and AFLURIA, (age 3 y+), Quadv PF, 0.5mL 10/10/2017, 10/21/2020, 11/01/2022    Influenza, FLUCELVAX, (age 6 mo+) IM, Trivalent PF, 0.5mL 01/27/2025    Influenza, FLUCELVAX, (age 6 mo+), MDCK, Quadv PF, 0.5mL 12/15/2021, 01/09/2024    Influenza, Quadv, 6 mo and older, IM, PF (Flulaval, Fluarix) 11/28/2018    Pneumococcal, PPSV23, PNEUMOVAX 23, (age 2y+), SC/IM, 0.5mL 02/14/2022    TDaP, ADACEL (age 10y-64y), BOOSTRIX (age 10y+), IM, 0.5mL 05/24/2017    Td vaccine (adult) 10/21/1996       PHYSICAL EXAM:     Initial Vital Signs:  height is 1.6 m (5' 3\") and weight is 83.9 kg (185 lb). Her oral temperature is 98.6 °F (37 °C). Her blood pressure is 138/74 and her pulse is 68. Her respiration is 18

## 2025-06-10 VITALS
RESPIRATION RATE: 20 BRPM | TEMPERATURE: 97.7 F | DIASTOLIC BLOOD PRESSURE: 80 MMHG | OXYGEN SATURATION: 93 % | WEIGHT: 181.88 LBS | HEART RATE: 89 BPM | HEIGHT: 63 IN | BODY MASS INDEX: 32.23 KG/M2 | SYSTOLIC BLOOD PRESSURE: 124 MMHG

## 2025-06-10 LAB
ALBUMIN SERPL-MCNC: 3.9 G/DL (ref 3.5–5.2)
ALBUMIN/GLOB SERPL: 1.3 {RATIO} (ref 1–2.5)
ALP SERPL-CCNC: 68 U/L (ref 35–104)
ALT SERPL-CCNC: 17 U/L (ref 10–35)
ANION GAP SERPL CALCULATED.3IONS-SCNC: 9 MMOL/L (ref 9–16)
AST SERPL-CCNC: 35 U/L (ref 10–35)
BASOPHILS # BLD: 0 K/UL (ref 0–0.2)
BASOPHILS NFR BLD: 0 % (ref 0–2)
BILIRUB SERPL-MCNC: <0.2 MG/DL (ref 0–1.2)
BUN SERPL-MCNC: 18 MG/DL (ref 8–23)
CALCIUM SERPL-MCNC: 8.8 MG/DL (ref 8.6–10.4)
CHLORIDE SERPL-SCNC: 105 MMOL/L (ref 98–107)
CO2 SERPL-SCNC: 27 MMOL/L (ref 20–31)
CREAT SERPL-MCNC: 0.8 MG/DL (ref 0.6–0.9)
EKG ATRIAL RATE: 88 BPM
EKG P AXIS: 80 DEGREES
EKG P-R INTERVAL: 154 MS
EKG Q-T INTERVAL: 410 MS
EKG QRS DURATION: 88 MS
EKG QTC CALCULATION (BAZETT): 496 MS
EKG R AXIS: 62 DEGREES
EKG T AXIS: 63 DEGREES
EKG VENTRICULAR RATE: 88 BPM
EOSINOPHIL # BLD: 0 K/UL (ref 0–0.4)
EOSINOPHILS RELATIVE PERCENT: 0 % (ref 1–4)
ERYTHROCYTE [DISTWIDTH] IN BLOOD BY AUTOMATED COUNT: 16.7 % (ref 12.5–15.4)
GFR, ESTIMATED: 83 ML/MIN/1.73M2
GLUCOSE SERPL-MCNC: 140 MG/DL (ref 74–99)
HCT VFR BLD AUTO: 38 % (ref 36–46)
HGB BLD-MCNC: 12 G/DL (ref 12–16)
LYMPHOCYTES NFR BLD: 1.2 K/UL (ref 1–4.8)
LYMPHOCYTES RELATIVE PERCENT: 10 % (ref 24–44)
MCH RBC QN AUTO: 26.4 PG (ref 26–34)
MCHC RBC AUTO-ENTMCNC: 31.5 G/DL (ref 31–37)
MCV RBC AUTO: 83.6 FL (ref 80–100)
MONOCYTES NFR BLD: 0.5 K/UL (ref 0.1–1.2)
MONOCYTES NFR BLD: 4 % (ref 2–11)
NEUTROPHILS NFR BLD: 86 % (ref 36–66)
NEUTS SEG NFR BLD: 10.5 K/UL (ref 1.8–7.7)
PLATELET # BLD AUTO: 194 K/UL (ref 140–450)
PMV BLD AUTO: 9 FL (ref 6–12)
POTASSIUM SERPL-SCNC: 4.4 MMOL/L (ref 3.7–5.3)
PROT SERPL-MCNC: 6.9 G/DL (ref 6.6–8.7)
RBC # BLD AUTO: 4.54 M/UL (ref 4–5.2)
SODIUM SERPL-SCNC: 141 MMOL/L (ref 136–145)
WBC OTHER # BLD: 12.2 K/UL (ref 3.5–11)

## 2025-06-10 PROCEDURE — 93010 ELECTROCARDIOGRAM REPORT: CPT | Performed by: INTERNAL MEDICINE

## 2025-06-10 PROCEDURE — 6370000000 HC RX 637 (ALT 250 FOR IP)

## 2025-06-10 PROCEDURE — 6360000002 HC RX W HCPCS

## 2025-06-10 PROCEDURE — 6370000000 HC RX 637 (ALT 250 FOR IP): Performed by: NURSE PRACTITIONER

## 2025-06-10 PROCEDURE — 99238 HOSP IP/OBS DSCHRG MGMT 30/<: CPT | Performed by: HOSPITALIST

## 2025-06-10 PROCEDURE — 6370000000 HC RX 637 (ALT 250 FOR IP): Performed by: HOSPITALIST

## 2025-06-10 PROCEDURE — 94761 N-INVAS EAR/PLS OXIMETRY MLT: CPT

## 2025-06-10 PROCEDURE — 2500000003 HC RX 250 WO HCPCS

## 2025-06-10 PROCEDURE — 36415 COLL VENOUS BLD VENIPUNCTURE: CPT

## 2025-06-10 PROCEDURE — 94640 AIRWAY INHALATION TREATMENT: CPT

## 2025-06-10 PROCEDURE — 85025 COMPLETE CBC W/AUTO DIFF WBC: CPT

## 2025-06-10 PROCEDURE — 80053 COMPREHEN METABOLIC PANEL: CPT

## 2025-06-10 RX ORDER — BENZONATATE 100 MG/1
200 CAPSULE ORAL 3 TIMES DAILY PRN
Status: DISCONTINUED | OUTPATIENT
Start: 2025-06-10 | End: 2025-06-10 | Stop reason: HOSPADM

## 2025-06-10 RX ORDER — GUAIFENESIN 600 MG/1
600 TABLET, EXTENDED RELEASE ORAL 2 TIMES DAILY
Qty: 30 TABLET | Refills: 0 | Status: SHIPPED | OUTPATIENT
Start: 2025-06-10

## 2025-06-10 RX ORDER — PREDNISONE 10 MG/1
TABLET ORAL
Qty: 30 TABLET | Refills: 0 | Status: SHIPPED | OUTPATIENT
Start: 2025-06-10 | End: 2025-06-22

## 2025-06-10 RX ORDER — BENZONATATE 200 MG/1
200 CAPSULE ORAL 3 TIMES DAILY PRN
Qty: 30 CAPSULE | Refills: 0 | Status: SHIPPED | OUTPATIENT
Start: 2025-06-10 | End: 2025-06-17

## 2025-06-10 RX ADMIN — OXYBUTYNIN CHLORIDE 15 MG: 5 TABLET, EXTENDED RELEASE ORAL at 08:47

## 2025-06-10 RX ADMIN — GUAIFENESIN 600 MG: 600 TABLET, EXTENDED RELEASE ORAL at 08:47

## 2025-06-10 RX ADMIN — ASPIRIN 81 MG: 81 TABLET, CHEWABLE ORAL at 08:47

## 2025-06-10 RX ADMIN — Medication 2000 UNITS: at 08:47

## 2025-06-10 RX ADMIN — HYDROXYZINE HYDROCHLORIDE 50 MG: 25 TABLET, FILM COATED ORAL at 08:56

## 2025-06-10 RX ADMIN — ROSUVASTATIN CALCIUM 20 MG: 20 TABLET, FILM COATED ORAL at 08:47

## 2025-06-10 RX ADMIN — IPRATROPIUM BROMIDE AND ALBUTEROL SULFATE 1 DOSE: .5; 2.5 SOLUTION RESPIRATORY (INHALATION) at 08:56

## 2025-06-10 RX ADMIN — METHYLPREDNISOLONE SODIUM SUCCINATE 40 MG: 40 INJECTION, POWDER, LYOPHILIZED, FOR SOLUTION INTRAMUSCULAR; INTRAVENOUS at 08:46

## 2025-06-10 RX ADMIN — PANTOPRAZOLE SODIUM 40 MG: 40 TABLET, DELAYED RELEASE ORAL at 08:47

## 2025-06-10 RX ADMIN — BENZONATATE 200 MG: 100 CAPSULE ORAL at 00:30

## 2025-06-10 RX ADMIN — BENZONATATE 200 MG: 100 CAPSULE ORAL at 08:56

## 2025-06-10 RX ADMIN — SODIUM CHLORIDE, PRESERVATIVE FREE 10 ML: 5 INJECTION INTRAVENOUS at 08:46

## 2025-06-10 RX ADMIN — QUETIAPINE FUMARATE 100 MG: 100 TABLET ORAL at 08:47

## 2025-06-10 RX ADMIN — ENOXAPARIN SODIUM 40 MG: 100 INJECTION SUBCUTANEOUS at 08:46

## 2025-06-10 RX ADMIN — METHYLPREDNISOLONE SODIUM SUCCINATE 40 MG: 40 INJECTION, POWDER, LYOPHILIZED, FOR SOLUTION INTRAMUSCULAR; INTRAVENOUS at 00:30

## 2025-06-10 RX ADMIN — TOPIRAMATE 50 MG: 25 TABLET, FILM COATED ORAL at 08:47

## 2025-06-10 ASSESSMENT — PAIN SCALES - GENERAL
PAINLEVEL_OUTOF10: 0

## 2025-06-10 NOTE — PROGRESS NOTES
[facility]  Office: [phone]  Kayode Quesada DO, Wilmer Pringle, DO, Felipe Zazueta DO, Brandon Patel, DO, Kike Mulligan MD, Wen Borden MD, Gabe Talbert MD, Alexandra Mas MD,  Chris Izquierdo MD, Imtiaz Moreno MD, Rajesh Greene MD,  Teresita Emmanuel DO, Zaynab Qureshi MD, Bharathi Fam MD, Quintin Quesada DO, Soheila Schrader MD,  Mayo Fuller DO, Mayte Vera MD, Heydi Zepeda MD, Leia Christie MD,  Juan Pablo Landry MD, Liz Maher MD, Anita Bains MD, Sarah Kent MD, Stephon Fagan MD, Jann Chambers MD, John Paul Syed, DO, Nia Zuñiga MD, Mamie Zamora DO, Joseph Kay MD, Teresita Sparrow MD, Mohsin Reza, MD, Ashly Roberts MD, Shirley Waterhouse, CNP,  Kalyn Esparza, CNP, John Paul Mackey, CNP,  Tiffany Rosales, CLEMENT, Kristel Stevens, CNP, Flores Juan, CNP, Jia Poe, CNP, Chelo De Jesus, CNP, Mei Murillo, PA-C, Khushi Howell, CNP, Charu Sen, CNP,  Muna Estes, CNP, Laura Sanchez, CNP, Isaiah Tate, PA-C, Ericka Olivera, CNP,  Daisy Young, CNS, Kyra Olsen, CNP, Elyssa Bryson, CNP,   Denita Rivera, CNP         [facility]   IN-PATIENT SERVICE   [facility]    Progress Note    6/10/2025    11:39 AM    Name:   Digna Guaman  MRN:     [id number]     Acct:      [id number]   Room:   322/322-01  IP Day:  1  Admit Date:  6/9/2025  3:17 AM    PCP:   Husam Chapin DO  Code Status:  Full Code    Subjective:     Patient seen in follow-up for acute exacerbation of COPD secondary to tobacco abuse complicated by concurrent parainfluenza infection.  Patient states \"I feel much better\"    Patient has been successfully weaned off of oxygen.  Her breathing has improved dramatically.  She still has a cough which is anticipated with her COPD exacerbation and parainfluenza virus infection.  She is clinically improving with steroids and supportive care.  She is eager for discharge home and at this point in time I have no objection.  She has been strongly 
Bronchodilator order with Frequency of every 4 hours PRN for wheezing or increased work of breathing using Per Protocol order mode.        4-6 - enter or revise RT Bronchodilator order(s) to two equivalent RT bronchodilator orders with one order with BID Frequency and one order with Frequency of every 4 hours PRN wheezing or increased work of breathing using Per Protocol order mode.        7-10 - enter or revise RT Bronchodilator order(s) to two equivalent RT bronchodilator orders with one order with TID Frequency and one order with Frequency of every 4 hours PRN wheezing or increased work of breathing using Per Protocol order mode.       11-13 - enter or revise RT Bronchodilator order(s) to one equivalent RT bronchodilator order with QID Frequency and an Albuterol order with Frequency of every 4 hours PRN wheezing or increased work of breathing using Per Protocol order mode.      Greater than 13 - enter or revise RT Bronchodilator order(s) to one equivalent RT bronchodilator order with every 4 hours Frequency and an Albuterol order with Frequency of every 2 hours PRN wheezing or increased work of breathing using Per Protocol order mode.     RT to enter RT Home Evaluation for COPD & MDI Assessment order using Per Protocol order mode.    Electronically signed by NIGEL HOWARD RCP on 6/9/2025 at 3:32 PM  
assistance  Quality of Gait: no losses of balance, slow nehemiah  Gait Deviations: Slow Nehemiah;Decreased step length  Distance: 20ft  Comments: limited due to low endurance; SpO2 88% on 3L after ambulation and required ~1 minute to recover back into low 90%s      Balance   Balance  Posture: Good  Sitting - Static: Good  Sitting - Dynamic: Fair  Standing - Static: Fair;+  Standing - Dynamic: Fair  Comments: standing balance assessed without device      Patient Education  Patient Education  Education Given To: Patient  Education Provided: Role of Therapy;Plan of Care;Transfer Training  Education Method: Verbal;Demonstration  Barriers to Learning: None  Education Outcome: Verbalized understanding;Demonstrated understanding    Plan  Physical Therapy Plan  General Plan:  (5-6x/week)  Current Treatment Recommendations: Strengthening, Functional mobility training, Transfer training, Therapeutic activities, Balance training, Home exercise program, Safety education & training, Gait training, Stair training    Goals  Short Term Goals  Time Frame for Short Term Goals: 14 visits  Short Term Goal 1: The patient will be independent with home exercise program.  Short Term Goal 2: The patient will be able to perform all bed mobility independently.  Short Term Goal 3: The patient will be able to perform transfers  independently.  Short Term Goal 4: The patient will be able to ambulate 200 ft independently.  Short Term Goal 5: The patient will be able to negotiate 1 stairs modified independently.    Minutes  PT Individual Minutes  Time In: 1307  Time Out: 1327  Minutes: 20  Time Code Minutes  Timed Code Treatment Minutes: 8 Minutes    Electronically signed by Heydi Hendrix PT on 6/9/25 at 2:28 PM EDT    
Jose Luis Zeng, OTRL on 6/9/25 at 1:46 PM EDT

## 2025-06-10 NOTE — CARE COORDINATION
1106am- [employer] cab scheduled for 1130am at Entrance C. RN notified.   
discharge to: (P) Apartment  Plan for transportation at discharge: (P) Cab    Financial    Payor: [employer] [place] / Plan: [place] / Product Type: *No Product type* /     Does insurance require precert for SNF: Yes    Potential assistance Purchasing Medications: (P) No  Meds-to-Beds request:        [id number] - [place] - [address] - P [phone] - F [phone]  [address] [facility] [address]  Phone: [phone] Fax: [phone]    [facility] [address] [facility] [employer] - [place], [place] - [address] - P [phone] - F [phone]  [address] [address]  Phone: [phone] Fax: [phone]      Notes:    Factors facilitating achievement of predicted outcomes: Cooperative and Pleasant    Barriers to discharge: Medical complications    Additional Case Management Notes: Patient from home. Patient lives alone. Patient reports being independent for all ADLs prior to admission. Patient reports no use of DME. Patient plans to discharge home independent. Patient will need transportation arranged at discharge.     The Plan for Transition of Care is related to the following treatment goals of Hypoxemia [R09.02]  COPD exacerbation (HCC) [J44.1]    IF APPLICABLE: The Patient and/or patient representative Digna and her family were provided with a choice of provider and agrees with the discharge plan. Freedom of choice list with basic dialogue that supports the patient's individualized plan of care/goals and shares the quality data associated with the providers was provided to: (P) Patient   Patient Representative Name:       The Patient and/or Patient Representative Agree with the Discharge Plan? (P) Yes    Noé Colindres  Case Management Department

## 2025-06-10 NOTE — DISCHARGE SUMMARY
[facility]  Office: [phone]  Kayode Quesada DO, Wilmer Pringle DO, Felipe Zazueta DO, Brandon Patel DO, Kike Mulligan MD, Wen Borden MD, Gabe Talbert MD, Alexandra Mas MD,  Chris Izquierdo MD, Imtiaz Moreno MD, Rajesh Greene MD,  Teresita Emmanuel DO, Zaynab Qureshi MD, Bahrathi Fam MD, Quintin Quesada DO, Soheila Schrader MD,  Mayo Fuller DO, Mayte Vera MD, Heydi Zepeda MD, Leia Christie MD,  Juan Pablo Landry MD, Liz Maher MD, Anita Bains MD, Sarah Kent MD, Stephon Fagan MD, Jann Chambers MD, John Paul Syed, DO, Nia Zuñiga MD, Mamie Zamora DO, Joseph Kay MD, Teresita Sparrow MD, Mohsin Reza, MD, Ashly Roberts MD, Shirley Waterhouse, CNP,  Kalyn Esparza, CNP, John Paul Mackey, CNP,  Tiffany Rosales, CLEMENT, Kristel Stevens, CNP, Flores Juan, CNP, Jia Poe, CNP, Chelo De Jesus, CNP, Mei Murillo, PA-C, Khushi Howell, CNP, Charu Sen, CNP,  Muna Estes, CNP, Laura Sanchez, CNP, Isaiah Tate, PA-C, Ericka Olivera, CNP,  Daisy Young, CNS, Kyra Olsen, CNP, Elyssa Bryson, CNP,   Denita Rivera, CNP         [facility]   IN-PATIENT SERVICE   [facility]    Discharge Summary     Patient ID: Digna Guaman  :  1960   MRN: [id number]     ACCOUNT:  [id number]   Patient's PCP: Husam Chapin DO  Admit Date: 2025   Discharge Date: 6/10/2025  Length of Stay: 1  Code Status:  Full Code  Admitting Physician: Quintin T Retholtz, DO  Discharge Physician: Quintin Quesada DO     Active Discharge Diagnoses:     Hospital Problem Lists:  Principal Problem:    COPD exacerbation (HCC)  Resolved Problems:    * No resolved hospital problems. *      Admission Condition:  fair     Discharged Condition: good    Hospital Stay:     Hospital Course:  Digna Guaman is a 64 y.o. female who was admitted for the management of COPD exacerbation (HCC) , presented to ER with Shortness of Breath (Via EMS for SOB. PT reports to experiencing SOB. She

## 2025-06-10 NOTE — PLAN OF CARE
Problem: Respiratory - Adult  Goal: Achieves optimal ventilation and oxygenation  Outcome: Progressing  Flowsheets  Taken 6/10/2025 0025 by Razia Li RN  Achieves optimal ventilation and oxygenation:   Assess for changes in respiratory status   Assess for changes in mentation and behavior   Position to facilitate oxygenation and minimize respiratory effort   Oxygen supplementation based on oxygen saturation or arterial blood gases   Encourage broncho-pulmonary hygiene including cough, deep breathe, incentive spirometry   Assess and instruct to report shortness of breath or any respiratory difficulty   Respiratory therapy support as indicated  Taken 6/9/2025 2325 by Pacheco Colon RCP  Achieves optimal ventilation and oxygenation: Assess for changes in respiratory status  Taken 6/9/2025 1520 by Angelica Ridley RN  Achieves optimal ventilation and oxygenation:   Assess for changes in respiratory status   Assess for changes in mentation and behavior   Position to facilitate oxygenation and minimize respiratory effort   Oxygen supplementation based on oxygen saturation or arterial blood gases   Assess and instruct to report shortness of breath or any respiratory difficulty   Respiratory therapy support as indicated  Taken 6/9/2025 1115 by Chanda Daniel RCP  Achieves optimal ventilation and oxygenation: Assess for changes in respiratory status     Problem: Safety - Adult  Goal: Free from fall injury  Outcome: Progressing  Flowsheets (Taken 6/10/2025 0025)  Free From Fall Injury: Instruct family/caregiver on patient safety     Problem: Discharge Planning  Goal: Discharge to home or other facility with appropriate resources  Outcome: Progressing  Flowsheets (Taken 6/10/2025 0025)  Discharge to home or other facility with appropriate resources: Identify barriers to discharge with patient and caregiver     Problem: Pain  Goal: Verbalizes/displays adequate comfort level or baseline comfort level  Outcome:

## 2025-06-10 NOTE — RT PROTOCOL NOTE
Frequency of every 4 hours PRN for wheezing or increased work of breathing using Per Protocol order mode.        4-6 - enter or revise RT Bronchodilator order(s) to two equivalent RT bronchodilator orders with one order with BID Frequency and one order with Frequency of every 4 hours PRN wheezing or increased work of breathing using Per Protocol order mode.        7-10 - enter or revise RT Bronchodilator order(s) to two equivalent RT bronchodilator orders with one order with TID Frequency and one order with Frequency of every 4 hours PRN wheezing or increased work of breathing using Per Protocol order mode.       11-13 - enter or revise RT Bronchodilator order(s) to one equivalent RT bronchodilator order with QID Frequency and an Albuterol order with Frequency of every 4 hours PRN wheezing or increased work of breathing using Per Protocol order mode.      Greater than 13 - enter or revise RT Bronchodilator order(s) to one equivalent RT bronchodilator order with every 4 hours Frequency and an Albuterol order with Frequency of every 2 hours PRN wheezing or increased work of breathing using Per Protocol order mode.     RT to enter RT Home Evaluation for COPD & MDI Assessment order using Per Protocol order mode.    Electronically signed by Liliane Michaels RCP on 6/10/2025 at 9:03 AM

## 2025-06-10 NOTE — PLAN OF CARE
Problem: Respiratory - Adult  Goal: Achieves optimal ventilation and oxygenation  6/10/2025 1221 by Marilia Brito RN  Outcome: Adequate for Discharge  Flowsheets (Taken 6/10/2025 0830)  Achieves optimal ventilation and oxygenation: Assess for changes in respiratory status  6/10/2025 0025 by Razia Li RN  Outcome: Progressing  Flowsheets  Taken 6/10/2025 0025 by Razia Li RN  Achieves optimal ventilation and oxygenation:   Assess for changes in respiratory status   Assess for changes in mentation and behavior   Position to facilitate oxygenation and minimize respiratory effort   Oxygen supplementation based on oxygen saturation or arterial blood gases   Encourage broncho-pulmonary hygiene including cough, deep breathe, incentive spirometry   Assess and instruct to report shortness of breath or any respiratory difficulty   Respiratory therapy support as indicated  Taken 6/9/2025 2325 by Pacheco Colon RCP  Achieves optimal ventilation and oxygenation: Assess for changes in respiratory status  Taken 6/9/2025 1520 by Angelica Ridley RN  Achieves optimal ventilation and oxygenation:   Assess for changes in respiratory status   Assess for changes in mentation and behavior   Position to facilitate oxygenation and minimize respiratory effort   Oxygen supplementation based on oxygen saturation or arterial blood gases   Assess and instruct to report shortness of breath or any respiratory difficulty   Respiratory therapy support as indicated  Taken 6/9/2025 1115 by Chanda Daniel RCP  Achieves optimal ventilation and oxygenation: Assess for changes in respiratory status     Problem: Safety - Adult  Goal: Free from fall injury  6/10/2025 1221 by Marilia Brito RN  Outcome: Adequate for Discharge  Flowsheets (Taken 6/10/2025 0830)  Free From Fall Injury: Instruct family/caregiver on patient safety  6/10/2025 0025 by Razia Li RN  Outcome: Progressing  Flowsheets (Taken 6/10/2025 0025)  Free

## 2025-06-11 ENCOUNTER — TELEPHONE (OUTPATIENT)
Dept: PRIMARY CARE CLINIC | Age: 65
End: 2025-06-11

## 2025-06-11 DIAGNOSIS — J44.1 COPD EXACERBATION (HCC): Primary | ICD-10-CM

## 2025-06-11 NOTE — PROGRESS NOTES
Post-Discharge Transitional Care  Follow Up      Digna Guaman   YOB: 1960    Date of Office Visit:  6/13/2025  Date of Hospital Admission: 6/9/25  Date of Hospital Discharge: 6/10/25  Risk of hospital readmission (high >=14%. Medium >=10%) :Readmission Risk Score: 13.2      Care management risk score Rising risk (score 2-5) and Complex Care (Scores >=6): No Risk Score On File     Non face to face  following discharge, date last encounter closed (first attempt may have been earlier): 06/11/2025    Call initiated 2 business days of discharge: Yes    ASSESSMENT/PLAN:   Hospital discharge follow-up  -     AK DISCHARGE MEDS RECONCILED W/ CURRENT OUTPATIENT MED LIST  Pulmonary emphysema, unspecified emphysema type (HCC)  -     Nebulizers (COMPRESSOR/NEBULIZER) MISC; Disp-1 each, R-0, PrintUse with medications as directed.  -     fluticasone-umeclidin-vilant (TRELEGY ELLIPTA) 100-62.5-25 MCG/ACT AEPB inhaler; Inhale 1 puff into the lungs daily, Disp-1 each, R-0Sample  -     fluticasone-umeclidin-vilant (TRELEGY ELLIPTA) 100-62.5-25 MCG/ACT AEPB inhaler; Inhale 1 puff into the lungs daily, Disp-1 each, R-3Normal  Smoker  Hypertension, unspecified type  -     isosorbide mononitrate (IMDUR) 60 MG extended release tablet; Take 1 tablet by mouth daily, Disp-90 tablet, R-0Normal  Restless legs syndrome  -     rOPINIRole (REQUIP) 3 MG tablet; Take 2 tablets by mouth nightly, Disp-180 tablet, R-0Normal    Continue prednisone taper.  Stop Combivent inhaler.  Start Trelegy Ellipta-sample inhaler provided to patient. Discussed the benefits, side effects and risks of this medication.   Nebulizer prescription sent to pharmacy.  Discussed the importance of smoking cessation.  Discussed monitoring oxygen levels-needs to seek more immediate medical attention if oxygen drops below 90%, fever, shortness of breath-patient verbalizes understanding.  Refills provided for requested medications-isosorbide mononitrate and

## 2025-06-11 NOTE — TELEPHONE ENCOUNTER
Patient requires a prescription for a nebulizer.     She was told to use one by [facility] but she does not have one at home.    They prescribed the nebulizer solution, however did not send a prescription for the nebulizer machine.    Order pended for approval.    Please send to [facility].

## 2025-06-11 NOTE — TELEPHONE ENCOUNTER
Care Transitions Initial Follow Up Call    Outreach made within 2 business days of discharge: Yes    Patient: Digna Guaman Patient : 1960   MRN: [id number]  Reason for Admission: 25  Discharge Date: 6/10/25       Spoke with: Patient    Discharge department/facility: [facility] Interactive Patient Contact:  Was patient able to fill all prescriptions: No: Nebulizer-Prescription not given to patient  Was patient instructed to bring all medications to the follow-up visit: No  Is patient taking all medications as directed in the discharge summary? No  Does patient understand their discharge instructions: Yes  Does patient have questions or concerns that need addressed prior to 7-14 day follow up office visit: no    Additional needs identified to be addressed with provider  No needs identified             Scheduled appointment with PCP within 7-14 days    Follow Up  Future Appointments   Date Time Provider Department Center   2025  2:30 PM Marge Dan PA-C [facility] DEP   2025 10:00 AM Husam Chapin DO [facility]       Maci Omalley MA

## 2025-06-13 ENCOUNTER — OFFICE VISIT (OUTPATIENT)
Dept: PRIMARY CARE CLINIC | Age: 65
End: 2025-06-13

## 2025-06-13 ENCOUNTER — TELEPHONE (OUTPATIENT)
Dept: PRIMARY CARE CLINIC | Age: 65
End: 2025-06-13

## 2025-06-13 VITALS
TEMPERATURE: 98.2 F | BODY MASS INDEX: 32.32 KG/M2 | HEIGHT: 63 IN | WEIGHT: 182.4 LBS | HEART RATE: 67 BPM | DIASTOLIC BLOOD PRESSURE: 84 MMHG | SYSTOLIC BLOOD PRESSURE: 136 MMHG | OXYGEN SATURATION: 93 %

## 2025-06-13 DIAGNOSIS — G89.29 CHRONIC NECK PAIN: ICD-10-CM

## 2025-06-13 DIAGNOSIS — J43.9 PULMONARY EMPHYSEMA, UNSPECIFIED EMPHYSEMA TYPE (HCC): ICD-10-CM

## 2025-06-13 DIAGNOSIS — M54.2 CHRONIC NECK PAIN: ICD-10-CM

## 2025-06-13 DIAGNOSIS — Z09 HOSPITAL DISCHARGE FOLLOW-UP: Primary | ICD-10-CM

## 2025-06-13 DIAGNOSIS — G25.81 RESTLESS LEGS SYNDROME: ICD-10-CM

## 2025-06-13 DIAGNOSIS — I10 HYPERTENSION, UNSPECIFIED TYPE: ICD-10-CM

## 2025-06-13 DIAGNOSIS — F17.200 SMOKER: ICD-10-CM

## 2025-06-13 RX ORDER — FLUTICASONE FUROATE, UMECLIDINIUM BROMIDE AND VILANTEROL TRIFENATATE 100; 62.5; 25 UG/1; UG/1; UG/1
1 POWDER RESPIRATORY (INHALATION) DAILY
Qty: 1 EACH | Refills: 3 | Status: SHIPPED | OUTPATIENT
Start: 2025-06-13

## 2025-06-13 RX ORDER — ROPINIROLE 3 MG/1
6 TABLET, FILM COATED ORAL NIGHTLY
Qty: 180 TABLET | Refills: 0 | Status: SHIPPED | OUTPATIENT
Start: 2025-06-13

## 2025-06-13 RX ORDER — ISOSORBIDE MONONITRATE 60 MG/1
60 TABLET, EXTENDED RELEASE ORAL DAILY
Qty: 90 TABLET | Refills: 0 | Status: SHIPPED | OUTPATIENT
Start: 2025-06-13

## 2025-06-13 RX ORDER — FLUTICASONE FUROATE, UMECLIDINIUM BROMIDE AND VILANTEROL TRIFENATATE 100; 62.5; 25 UG/1; UG/1; UG/1
1 POWDER RESPIRATORY (INHALATION) DAILY
Qty: 1 EACH | Refills: 0 | Status: SHIPPED | COMMUNITY
Start: 2025-06-13

## 2025-06-13 NOTE — TELEPHONE ENCOUNTER
Patient requesting a larger quantity of pended medication. She stated that she's prescribed 4mg tablets because insurance won't cover 6mg.    She takes 1.5 tablets 3x/day and is requesting more than 30 tablets so she doesn't run out quickly. 90 pended. Please advise.

## 2025-06-17 DIAGNOSIS — J43.9 PULMONARY EMPHYSEMA, UNSPECIFIED EMPHYSEMA TYPE (HCC): ICD-10-CM

## 2025-06-20 DIAGNOSIS — J43.9 PULMONARY EMPHYSEMA, UNSPECIFIED EMPHYSEMA TYPE (HCC): ICD-10-CM

## 2025-07-31 ENCOUNTER — OFFICE VISIT (OUTPATIENT)
Dept: PRIMARY CARE CLINIC | Age: 65
End: 2025-07-31
Payer: MEDICAID

## 2025-07-31 VITALS
HEIGHT: 63 IN | SYSTOLIC BLOOD PRESSURE: 130 MMHG | DIASTOLIC BLOOD PRESSURE: 80 MMHG | OXYGEN SATURATION: 96 % | HEART RATE: 58 BPM | BODY MASS INDEX: 30.65 KG/M2 | WEIGHT: 173 LBS

## 2025-07-31 DIAGNOSIS — J43.9 PULMONARY EMPHYSEMA, UNSPECIFIED EMPHYSEMA TYPE (HCC): ICD-10-CM

## 2025-07-31 DIAGNOSIS — K21.9 GASTROESOPHAGEAL REFLUX DISEASE WITHOUT ESOPHAGITIS: ICD-10-CM

## 2025-07-31 DIAGNOSIS — I10 HYPERTENSION, UNSPECIFIED TYPE: ICD-10-CM

## 2025-07-31 DIAGNOSIS — M54.2 CHRONIC NECK PAIN: ICD-10-CM

## 2025-07-31 DIAGNOSIS — E78.2 MIXED HYPERLIPIDEMIA: ICD-10-CM

## 2025-07-31 DIAGNOSIS — G89.29 CHRONIC NECK PAIN: ICD-10-CM

## 2025-07-31 DIAGNOSIS — G43.009 MIGRAINE WITHOUT AURA AND WITHOUT STATUS MIGRAINOSUS, NOT INTRACTABLE: ICD-10-CM

## 2025-07-31 DIAGNOSIS — G43.D0 ABDOMINAL MIGRAINE, NOT INTRACTABLE: Primary | ICD-10-CM

## 2025-07-31 DIAGNOSIS — R73.03 PREDIABETES: ICD-10-CM

## 2025-07-31 DIAGNOSIS — J45.20 MILD INTERMITTENT ASTHMA, UNSPECIFIED WHETHER COMPLICATED: ICD-10-CM

## 2025-07-31 DIAGNOSIS — I25.10 CORONARY ARTERY DISEASE INVOLVING NATIVE CORONARY ARTERY OF NATIVE HEART WITHOUT ANGINA PECTORIS: ICD-10-CM

## 2025-07-31 DIAGNOSIS — R11.0 NAUSEA: ICD-10-CM

## 2025-07-31 DIAGNOSIS — N32.81 OVERACTIVE BLADDER: ICD-10-CM

## 2025-07-31 DIAGNOSIS — G25.81 RESTLESS LEGS SYNDROME: ICD-10-CM

## 2025-07-31 LAB — HBA1C MFR BLD: 6.2 %

## 2025-07-31 PROCEDURE — 83036 HEMOGLOBIN GLYCOSYLATED A1C: CPT | Performed by: STUDENT IN AN ORGANIZED HEALTH CARE EDUCATION/TRAINING PROGRAM

## 2025-07-31 PROCEDURE — 99214 OFFICE O/P EST MOD 30 MIN: CPT | Performed by: STUDENT IN AN ORGANIZED HEALTH CARE EDUCATION/TRAINING PROGRAM

## 2025-07-31 PROCEDURE — 3079F DIAST BP 80-89 MM HG: CPT | Performed by: STUDENT IN AN ORGANIZED HEALTH CARE EDUCATION/TRAINING PROGRAM

## 2025-07-31 PROCEDURE — 3075F SYST BP GE 130 - 139MM HG: CPT | Performed by: STUDENT IN AN ORGANIZED HEALTH CARE EDUCATION/TRAINING PROGRAM

## 2025-07-31 RX ORDER — FLUTICASONE PROPIONATE 110 UG/1
2 AEROSOL, METERED RESPIRATORY (INHALATION) 2 TIMES DAILY
Qty: 12 G | Refills: 3 | Status: SHIPPED | OUTPATIENT
Start: 2025-07-31 | End: 2026-07-31

## 2025-07-31 RX ORDER — ONDANSETRON 4 MG/1
4 TABLET, FILM COATED ORAL 3 TIMES DAILY PRN
Qty: 15 TABLET | Refills: 0 | Status: SHIPPED | OUTPATIENT
Start: 2025-07-31

## 2025-07-31 SDOH — ECONOMIC STABILITY: FOOD INSECURITY: WITHIN THE PAST 12 MONTHS, YOU WORRIED THAT YOUR FOOD WOULD RUN OUT BEFORE YOU GOT MONEY TO BUY MORE.: NEVER TRUE

## 2025-07-31 SDOH — ECONOMIC STABILITY: FOOD INSECURITY: WITHIN THE PAST 12 MONTHS, THE FOOD YOU BOUGHT JUST DIDN'T LAST AND YOU DIDN'T HAVE MONEY TO GET MORE.: NEVER TRUE

## 2025-07-31 ASSESSMENT — PATIENT HEALTH QUESTIONNAIRE - PHQ9
DEPRESSION UNABLE TO ASSESS: FUNCTIONAL CAPACITY MOTIVATION LIMITS ACCURACY
8. MOVING OR SPEAKING SO SLOWLY THAT OTHER PEOPLE COULD HAVE NOTICED. OR THE OPPOSITE, BEING SO FIGETY OR RESTLESS THAT YOU HAVE BEEN MOVING AROUND A LOT MORE THAN USUAL: NOT AT ALL
SUM OF ALL RESPONSES TO PHQ QUESTIONS 1-9: 0
10. IF YOU CHECKED OFF ANY PROBLEMS, HOW DIFFICULT HAVE THESE PROBLEMS MADE IT FOR YOU TO DO YOUR WORK, TAKE CARE OF THINGS AT HOME, OR GET ALONG WITH OTHER PEOPLE: NOT DIFFICULT AT ALL
3. TROUBLE FALLING OR STAYING ASLEEP: NOT AT ALL
6. FEELING BAD ABOUT YOURSELF - OR THAT YOU ARE A FAILURE OR HAVE LET YOURSELF OR YOUR FAMILY DOWN: NOT AT ALL
4. FEELING TIRED OR HAVING LITTLE ENERGY: NOT AT ALL
9. THOUGHTS THAT YOU WOULD BE BETTER OFF DEAD, OR OF HURTING YOURSELF: NOT AT ALL
7. TROUBLE CONCENTRATING ON THINGS, SUCH AS READING THE NEWSPAPER OR WATCHING TELEVISION: NOT AT ALL
5. POOR APPETITE OR OVEREATING: NOT AT ALL

## 2025-07-31 ASSESSMENT — ENCOUNTER SYMPTOMS
NAUSEA: 1
WHEEZING: 0
ABDOMINAL PAIN: 1
SHORTNESS OF BREATH: 0

## 2025-07-31 NOTE — PROGRESS NOTES
[facility]  [facility]  [address] [facility] [address]  Dept: [phone]    Digna Guaman is a 64 y.o. female established patient, who presents today for her medical conditions/complaints as noted below:    Chief Complaint   Patient presents with    Medication Refill     Patient states they the following concerns with her gerd, abdominal migraine with increased nausea and extreme pain       HPI:     Reports issues with reflux, abdominal migraines. Has been worse for the past month and a half. Reports a steady buildup of pain that then subsides.    HTN - metoprolol XL 50 mg daily     GERD - pantoprazole 40 mg daily     HLD - rosuvastatin 20 mg daily     Hx of MI - ASA 81 mg daily, imdur 60 mg daily     Urinary incontinence - oxybutynin XR 15 mg      RLS - Requip 3 mg nightly     Migraines - managed by neurology, topamax 50 mg 3x daily; sumatriptan PRN     Asthma, mild, intermittent - Flovent PRN, albuterol inhaler PRN - stopped smoking on 7/16/2025     Anxiety and depression - Seroquel 100 mg BID, hydroxyzine PRN     Chronic neck pain - tizanidine 4 mg TID PRN - taking 1.5 tablets    Reviewed prior notes: previous PCP  Reviewed previous labs.    No components found for: \"LDLCHOLESTEROL\", \"LDLCALC\"    (goal LDL is <100)   AST (U/L)   Date Value   06/10/2025 35     ALT (U/L)   Date Value   06/10/2025 17     BUN (mg/dL)   Date Value   06/10/2025 18     Hemoglobin A1C (%)   Date Value   07/31/2025 6.2     TSH (mIU/L)   Date Value   02/04/2016 1.65     BP Readings from Last 3 Encounters:   07/31/25 130/80   06/13/25 136/84   06/10/25 124/80          (goal 120/80)    Past Medical History:   Diagnosis Date    Anxiety and depression     Asthma     Back injury     CAD (coronary artery disease) 11/2018    Heart Attack. Stent placed at [facility]    Cataracts, bilateral     Chronic back pain 1986    Gastroesophageal reflux disease without esophagitis

## 2025-08-06 DIAGNOSIS — K21.9 GASTROESOPHAGEAL REFLUX DISEASE WITHOUT ESOPHAGITIS: ICD-10-CM

## 2025-08-07 RX ORDER — PANTOPRAZOLE SODIUM 40 MG/1
40 TABLET, DELAYED RELEASE ORAL 2 TIMES DAILY
Qty: 180 TABLET | Refills: 1 | Status: SHIPPED | OUTPATIENT
Start: 2025-08-07

## 2025-08-09 DIAGNOSIS — F32.A ANXIETY AND DEPRESSION: ICD-10-CM

## 2025-08-09 DIAGNOSIS — F41.9 ANXIETY AND DEPRESSION: ICD-10-CM

## 2025-08-11 RX ORDER — HYDROXYZINE PAMOATE 50 MG/1
CAPSULE ORAL
Qty: 90 CAPSULE | Refills: 2 | Status: SHIPPED | OUTPATIENT
Start: 2025-08-11

## 2025-08-25 DIAGNOSIS — F32.A ANXIETY AND DEPRESSION: ICD-10-CM

## 2025-08-25 DIAGNOSIS — F41.9 ANXIETY AND DEPRESSION: ICD-10-CM

## 2025-08-25 RX ORDER — QUETIAPINE FUMARATE 100 MG/1
100 TABLET, FILM COATED ORAL 2 TIMES DAILY
Qty: 180 TABLET | Refills: 1 | Status: SHIPPED | OUTPATIENT
Start: 2025-08-25

## 2025-08-28 DIAGNOSIS — M54.2 CHRONIC NECK PAIN: ICD-10-CM

## 2025-08-28 DIAGNOSIS — G89.29 CHRONIC NECK PAIN: ICD-10-CM

## 2025-08-29 DIAGNOSIS — E78.2 MIXED HYPERLIPIDEMIA: ICD-10-CM

## 2025-08-29 RX ORDER — ROSUVASTATIN CALCIUM 20 MG/1
20 TABLET, COATED ORAL DAILY
Qty: 90 TABLET | Refills: 1 | Status: SHIPPED | OUTPATIENT
Start: 2025-08-29

## 2025-09-02 DIAGNOSIS — I10 HYPERTENSION, UNSPECIFIED TYPE: ICD-10-CM

## 2025-09-02 RX ORDER — METOPROLOL SUCCINATE 50 MG/1
50 TABLET, EXTENDED RELEASE ORAL DAILY
Qty: 90 TABLET | Refills: 0 | Status: SHIPPED | OUTPATIENT
Start: 2025-09-02